# Patient Record
Sex: MALE | Race: OTHER | NOT HISPANIC OR LATINO | URBAN - METROPOLITAN AREA
[De-identification: names, ages, dates, MRNs, and addresses within clinical notes are randomized per-mention and may not be internally consistent; named-entity substitution may affect disease eponyms.]

---

## 2018-03-22 ENCOUNTER — INPATIENT (INPATIENT)
Facility: HOSPITAL | Age: 58
LOS: 2 days | Discharge: HOME | DRG: 247 | End: 2018-03-25
Attending: STUDENT IN AN ORGANIZED HEALTH CARE EDUCATION/TRAINING PROGRAM | Admitting: INTERNAL MEDICINE
Payer: MEDICAID

## 2018-03-22 VITALS
SYSTOLIC BLOOD PRESSURE: 116 MMHG | TEMPERATURE: 98 F | RESPIRATION RATE: 20 BRPM | DIASTOLIC BLOOD PRESSURE: 76 MMHG | OXYGEN SATURATION: 96 % | HEART RATE: 86 BPM

## 2018-03-22 DIAGNOSIS — I21.3 ST ELEVATION (STEMI) MYOCARDIAL INFARCTION OF UNSPECIFIED SITE: ICD-10-CM

## 2018-03-22 LAB
ANION GAP SERPL CALC-SCNC: 13 MMOL/L — SIGNIFICANT CHANGE UP (ref 5–17)
BUN SERPL-MCNC: 14 MG/DL — SIGNIFICANT CHANGE UP (ref 7–23)
CALCIUM SERPL-MCNC: 8.9 MG/DL — SIGNIFICANT CHANGE UP (ref 8.4–10.5)
CHLORIDE SERPL-SCNC: 102 MMOL/L — SIGNIFICANT CHANGE UP (ref 96–108)
CK MB BLD-MCNC: 10.1 % — HIGH (ref 0–3.5)
CK MB BLD-MCNC: 10.8 % — HIGH (ref 0–3.5)
CK MB CFR SERPL CALC: 507.2 NG/ML — HIGH (ref 0–6.7)
CK MB CFR SERPL CALC: 573.5 NG/ML — HIGH (ref 0–6.7)
CK SERPL-CCNC: 5028 U/L — HIGH (ref 30–200)
CK SERPL-CCNC: 5301 U/L — HIGH (ref 30–200)
CO2 SERPL-SCNC: 23 MMOL/L — SIGNIFICANT CHANGE UP (ref 22–31)
CREAT SERPL-MCNC: 0.81 MG/DL — SIGNIFICANT CHANGE UP (ref 0.5–1.3)
GLUCOSE SERPL-MCNC: 105 MG/DL — HIGH (ref 70–99)
HCT VFR BLD CALC: 42.6 % — SIGNIFICANT CHANGE UP (ref 39–50)
HGB BLD-MCNC: 14.8 G/DL — SIGNIFICANT CHANGE UP (ref 13–17)
MAGNESIUM SERPL-MCNC: 2 MG/DL — SIGNIFICANT CHANGE UP (ref 1.6–2.6)
MCHC RBC-ENTMCNC: 29.5 PG — SIGNIFICANT CHANGE UP (ref 27–34)
MCHC RBC-ENTMCNC: 34.7 GM/DL — SIGNIFICANT CHANGE UP (ref 32–36)
MCV RBC AUTO: 85 FL — SIGNIFICANT CHANGE UP (ref 80–100)
NT-PROBNP SERPL-SCNC: 129 PG/ML — SIGNIFICANT CHANGE UP (ref 0–300)
PHOSPHATE SERPL-MCNC: 3 MG/DL — SIGNIFICANT CHANGE UP (ref 2.5–4.5)
PLATELET # BLD AUTO: 206 K/UL — SIGNIFICANT CHANGE UP (ref 150–400)
POTASSIUM SERPL-MCNC: 4.2 MMOL/L — SIGNIFICANT CHANGE UP (ref 3.5–5.3)
POTASSIUM SERPL-SCNC: 4.2 MMOL/L — SIGNIFICANT CHANGE UP (ref 3.5–5.3)
RBC # BLD: 5 M/UL — SIGNIFICANT CHANGE UP (ref 4.2–5.8)
RBC # FLD: 11.9 % — SIGNIFICANT CHANGE UP (ref 10.3–14.5)
SODIUM SERPL-SCNC: 138 MMOL/L — SIGNIFICANT CHANGE UP (ref 135–145)
TROPONIN T SERPL-MCNC: 19.2 NG/ML — HIGH (ref 0–0.06)
TROPONIN T SERPL-MCNC: 7.98 NG/ML — HIGH (ref 0–0.06)
WBC # BLD: 6.9 K/UL — SIGNIFICANT CHANGE UP (ref 3.8–10.5)
WBC # FLD AUTO: 6.9 K/UL — SIGNIFICANT CHANGE UP (ref 3.8–10.5)

## 2018-03-22 PROCEDURE — 99223 1ST HOSP IP/OBS HIGH 75: CPT | Mod: GC

## 2018-03-22 PROCEDURE — 93458 L HRT ARTERY/VENTRICLE ANGIO: CPT | Mod: 26,59

## 2018-03-22 PROCEDURE — 92941 PRQ TRLML REVSC TOT OCCL AMI: CPT | Mod: RC

## 2018-03-22 PROCEDURE — 92921: CPT | Mod: RC

## 2018-03-22 PROCEDURE — 93306 TTE W/DOPPLER COMPLETE: CPT | Mod: 26

## 2018-03-22 PROCEDURE — 93010 ELECTROCARDIOGRAM REPORT: CPT | Mod: 59

## 2018-03-22 RX ORDER — INFLUENZA VIRUS VACCINE 15; 15; 15; 15 UG/.5ML; UG/.5ML; UG/.5ML; UG/.5ML
0.5 SUSPENSION INTRAMUSCULAR ONCE
Qty: 0 | Refills: 0 | Status: DISCONTINUED | OUTPATIENT
Start: 2018-03-22 | End: 2018-03-25

## 2018-03-22 RX ORDER — ATORVASTATIN CALCIUM 80 MG/1
80 TABLET, FILM COATED ORAL AT BEDTIME
Qty: 0 | Refills: 0 | Status: DISCONTINUED | OUTPATIENT
Start: 2018-03-22 | End: 2018-03-25

## 2018-03-22 RX ORDER — ASPIRIN/CALCIUM CARB/MAGNESIUM 324 MG
81 TABLET ORAL DAILY
Qty: 0 | Refills: 0 | Status: DISCONTINUED | OUTPATIENT
Start: 2018-03-23 | End: 2018-03-24

## 2018-03-22 RX ORDER — CLOPIDOGREL BISULFATE 75 MG/1
75 TABLET, FILM COATED ORAL DAILY
Qty: 0 | Refills: 0 | Status: DISCONTINUED | OUTPATIENT
Start: 2018-03-23 | End: 2018-03-25

## 2018-03-22 RX ORDER — HEPARIN SODIUM 5000 [USP'U]/ML
5000 INJECTION INTRAVENOUS; SUBCUTANEOUS EVERY 8 HOURS
Qty: 0 | Refills: 0 | Status: DISCONTINUED | OUTPATIENT
Start: 2018-03-22 | End: 2018-03-25

## 2018-03-22 RX ADMIN — ATORVASTATIN CALCIUM 80 MILLIGRAM(S): 80 TABLET, FILM COATED ORAL at 22:46

## 2018-03-22 RX ADMIN — HEPARIN SODIUM 5000 UNIT(S): 5000 INJECTION INTRAVENOUS; SUBCUTANEOUS at 22:46

## 2018-03-22 NOTE — CHART NOTE - NSCHARTNOTEFT_GEN_A_CORE
====================  CCU MIDNIGHT ROUNDS  ====================    [facility] SK[facility]  [id number]  Patient is a 58y old  Male who presents with a chief complaint of Chest pain (22 Mar 2018 14:25)      ====================  SUMMARY:  ====================  59 y/o M current smoker w/ PMHx of HLD p/w inferior wall STEMI s/p PCI w/ 3x AUDREY to RCA.       ====================  NEW EVENTS:  ====================    Received AUDREY x 3 to RCA for IWSTEMI.     ====================  VITALS (Last 12 hrs):  ====================    T(C): 36.7 (03-22-18 @ 17:00), Max: 36.7 (03-22-18 @ 13:25)  T(F): 98.1 (03-22-18 @ 17:00), Max: 98.1 (03-22-18 @ 13:25)  HR: 74 (03-22-18 @ 21:45) (68 - 96)  BP: 118/74 (03-22-18 @ 21:45) (97/66 - 129/65)  BP(mean): 85 (03-22-18 @ 21:45) (77 - 98)  ABP: --  ABP(mean): --  RR: 19 (03-22-18 @ 21:45) (13 - 40)  SpO2: 98% (03-22-18 @ 21:45) (96% - 100%)  Wt(kg): --  CVP(mm Hg): --  CVP(cm H2O): --  CO: --  CI: --  PA: --  PA(mean): --  PCWP: --  SVR: --  PVR: --    I&O's Summary    22 Mar 2018 07:01  -  22 Mar 2018 22:45  --------------------------------------------------------  IN: 300 mL / OUT: 300 mL / NET: 0 mL            ====================  NEW LABS:  ====================                          14.8   6.9   )-----------( 206      ( 22 Mar 2018 14:20 )             42.6     03-22    138  |  102  |  14  ----------------------------<  105<H>  4.2   |  23  |  0.81    Ca    8.9      22 Mar 2018 14:20  Phos  3.0     03-22  Mg     2.0     03-22        Troponin T, Serum: 7.98 ng/mL <H> (03-22-18 @ 22:05)  Creatine Kinase, Serum: 5301 U/L <H> (03-22-18 @ 14:20)  Troponin T, Serum: 19.20 ng/mL <H> (03-22-18 @ 14:20)    CKMB Units: 573.5 ng/mL (03-22 @ 14:20)          ====================  PLAN:  ====================  #CV: CHRISSY s/p AUDREY x3  -continue aspirin, plavix, atorvastatin   -start lopressor when blood pressure can tolerate  -trend cardiac enzymes  -Possible staged PCI tomorrow. Will follow up with interventional cards for LAD, possibly 70%  -replete K and Mg as necessary

## 2018-03-22 NOTE — H&P ADULT - NSHPSOCIALHISTORY_GEN_ALL_CORE
Social History:    Marital Status:  ( X  )    (   ) Single    (   )    (  )   Occupation: [employer]    Lives with: (  ) alone  ( X ) children   (  X) spouse   (  ) parents  (  ) other    Substance Use (street drugs): (X  ) never used  (  ) other:  Tobacco Usage:  (   ) never smoked   (   ) former smoker   ( X  ) current smoker- started 20-25 years ago, 10 cigarettes a day   Alcohol Usage: Denies

## 2018-03-22 NOTE — H&P ADULT - NSHPLABSRESULTS_GEN_ALL_CORE
< from: Cardiac Cath Lab - Adult (03.22.18 @ 11:30) >    VENTRICLES: EF estimated was 40 %.  CORONARY VESSELS: The coronary circulation is right dominant.  LM:   --  LM: Angiography showed minor luminal irregularities with no flow  limiting lesions.  LAD:   --  Mid LAD: There was a 25 % stenosis.  --  D1: There was a 50 % stenosis.  CX:   --  OM1: There was a 100 % stenosis.  RCA:   --  Proximal RCA: There was a 80 % stenosis.  --  Mid RCA: There was a 100 % stenosis.  --  RPDA: There was a 80 % stenosis.  COMPLICATIONS: There were no complications.  DIAGNOSTIC RECOMMENDATIONS: ASA and Plavix for 1 year.  INTERVENTIONAL RECOMMENDATIONS: ASA and Plavix for 1 year.    < end of copied text > 14.8   6.9   )-----------( 206      ( 22 Mar 2018 14:20 )             42.6       03-22    138  |  102  |  14  ----------------------------<  105<H>  4.2   |  23  |  0.81    Ca    8.9      22 Mar 2018 14:20  Phos  3.0     03-22  Mg     2.0     03-22            CARDIAC MARKERS ( 22 Mar 2018 14:20 )  x     / 19.20 ng/mL / 5301 U/L / x     / 573.5 ng/mL        < from: Cardiac Cath Lab - Adult (03.22.18 @ 11:30) >    VENTRICLES: EF estimated was 40 %.  CORONARY VESSELS: The coronary circulation is right dominant.  LM:   --  LM: Angiography showed minor luminal irregularities with no flow  limiting lesions.  LAD:   --  Mid LAD: There was a 25 % stenosis.  --  D1: There was a 50 % stenosis.  CX:   --  OM1: There was a 100 % stenosis.  RCA:   --  Proximal RCA: There was a 80 % stenosis.  --  Mid RCA: There was a 100 % stenosis.  --  RPDA: There was a 80 % stenosis.  COMPLICATIONS: There were no complications.  DIAGNOSTIC RECOMMENDATIONS: ASA and Plavix for 1 year.  INTERVENTIONAL RECOMMENDATIONS: ASA and Plavix for 1 year.    < end of copied text >    Initial EKG showed NSR, HR 72, nl axis, QTc 392, ST seg elevations in II, III, aVF, ST depressions in aVL, V1-V3

## 2018-03-22 NOTE — H&P ADULT - ASSESSMENT
59 y/o M current smoker w/ PMHx of HLD p/w 57 y/o M current smoker w/ PMHx of HLD p/w inferior wall STEMI s/p PCI w/ 3x AUDREY to RCA.     #Neuro- Stable, no deficits     #CV- s/p PCI w/ 3 AUDREY to RCA  - started on ASA, Plavix, Atorvastatin  - BP soft will delay starting BB and ACEi  - Trend Mao, monitor EKGs     #Pulm- stable, sating well on RA    #GI- tolerating PO, started DASH/TLC     #Renal- Cr stable, urinating w/o difficulty     #Electrolytes- stable, replete Mag<2 or K<4    #Heme- CBC stable, HSQ for DVT ppx    #Endocrine- stable, checking A1c and TSH 59 y/o M current smoker w/ PMHx of HLD p/w inferior wall STEMI s/p PCI w/ 3x AUDREY to RCA.     #Neuro- Stable, no deficits     #CV- s/p PCI w/ 3 AUDREY to RCA  - started on ASA, Plavix, Atorvastatin  - BP soft will delay starting BB and ACEi  - Trend Mao, monitor EKGs   - Check TTE    #Pulm- stable, sating well on RA    #GI- tolerating PO, started DASH/TLC     #Renal- Cr stable, urinating w/o difficulty     #Electrolytes- stable, replete Mag<2 or K<4    #Heme- CBC stable, HSQ for DVT ppx    #Endocrine- stable, checking A1c and TSH

## 2018-03-22 NOTE — H&P ADULT - HISTORY OF PRESENT ILLNESS
59 y/o M current smoker w/ PMHx of HLD p/w chest pain.   Patient reports waking up this morning feeling well. He then reports shoveling snow without any difficulties and then getting in his car. While he was driving his car, he reports feeling substernal chest pain at around 8am. He reports the pain as needles in his sub sternum that was 4 or 5/10 in intensity, didn't move around or radiate anywhere. At this time he reports just not feeling well, but couldn't specify anything beyond that.   Denies any SOB, palpitations, diaphoresis, F/C, headaches, N/V. No recent travel or sick contacts.   He reports initially driving to his PCP in [place] while having the CP, but his office wasn't open, so he then drove to his brothers house. He brother then drove him to [facility]. There they diagnosed  him with a STEMI and transferred him to [facility] for an emergent cath. 57 y/o M current smoker w/ PMHx of HLD p/w chest pain.   Patient reports waking up this morning feeling well. He then reports shoveling snow without any difficulties and then getting in his car. While he was driving his car, he reports feeling substernal chest pain at around 8am. He reports the pain as needles in his sub sternum that was 4 or 5/10 in intensity, didn't move around or radiate anywhere. At this time he reports just not feeling well, but couldn't specify anything beyond that.   Denies any SOB, palpitations, diaphoresis, F/C, headaches, N/V. No recent travel or sick contacts.   He reports initially driving to his PCP in [place] while having the CP, but his office wasn't open, so he then drove to his brothers house. He brother then drove him to [facility]. There they diagnosed  him with a STEMI and transferred him to [facility] for an emergent cath.   Initial labs at OSH showed Trop 0.147, . Loaded with ASA 325mg, Plavix 600mg, Heparin bolus

## 2018-03-22 NOTE — H&P ADULT - NSHPPHYSICALEXAM_GEN_ALL_CORE
Vital Signs Last 24 Hrs  T(C): 36.7 (22 Mar 2018 13:25), Max: 36.7 (22 Mar 2018 13:25)  T(F): 98.1 (22 Mar 2018 13:25), Max: 98.1 (22 Mar 2018 13:25)  HR: 80 (22 Mar 2018 14:15) (76 - 86)  BP: 114/86 (22 Mar 2018 14:15) (105/81 - 117/81)  BP(mean): 98 (22 Mar 2018 14:15) (89 - 98)  RR: 24 (22 Mar 2018 14:15) (16 - 24)  SpO2: 100% (22 Mar 2018 14:15) (96% - 100%)    PHYSICAL EXAM:    GENERAL: Comfortable, no acute distress   HEAD:  Normocephalic, atraumatic  EYES: EOMI, PERRLA  HEENT: Moist mucous membranes  NECK: Supple, No JVD  NERVOUS SYSTEM:  Alert & Oriented X3, Motor Strength 5/5 B/L upper and lower extremities  CHEST/LUNG: Clear to auscultation bilaterally  HEART: Regular rate and rhythm, no murmur   ABDOMEN: Soft, Nontender, Nondistended, Bowel sounds present  EXTREMITIES:   No clubbing, cyanosis, or edema  MUSCULOSKELETAL: No muscle tenderness, no joint tenderness  SKIN:  warm and dry, no rash, Band on R radial artery, no bleeding or hematoma seen

## 2018-03-22 NOTE — H&P ADULT - NEGATIVE NEUROLOGICAL SYMPTOMS
no generalized seizures/no weakness/no transient paralysis/no focal seizures/no syncope/no paresthesias

## 2018-03-23 LAB
ALBUMIN SERPL ELPH-MCNC: 3.7 G/DL — SIGNIFICANT CHANGE UP (ref 3.3–5)
ALP SERPL-CCNC: 45 U/L — SIGNIFICANT CHANGE UP (ref 40–120)
ALT FLD-CCNC: 111 U/L RC — HIGH (ref 10–45)
ANION GAP SERPL CALC-SCNC: 15 MMOL/L — SIGNIFICANT CHANGE UP (ref 5–17)
APTT BLD: 26.6 SEC — LOW (ref 27.5–37.4)
AST SERPL-CCNC: 392 U/L — HIGH (ref 10–40)
BILIRUB SERPL-MCNC: 0.5 MG/DL — SIGNIFICANT CHANGE UP (ref 0.2–1.2)
BUN SERPL-MCNC: 14 MG/DL — SIGNIFICANT CHANGE UP (ref 7–23)
CALCIUM SERPL-MCNC: 9 MG/DL — SIGNIFICANT CHANGE UP (ref 8.4–10.5)
CHLORIDE SERPL-SCNC: 103 MMOL/L — SIGNIFICANT CHANGE UP (ref 96–108)
CHOLEST SERPL-MCNC: 167 MG/DL — SIGNIFICANT CHANGE UP (ref 10–199)
CK MB BLD-MCNC: 8.2 % — HIGH (ref 0–3.5)
CK MB CFR SERPL CALC: 295.9 NG/ML — HIGH (ref 0–6.7)
CK SERPL-CCNC: 3594 U/L — HIGH (ref 30–200)
CO2 SERPL-SCNC: 22 MMOL/L — SIGNIFICANT CHANGE UP (ref 22–31)
CREAT SERPL-MCNC: 0.83 MG/DL — SIGNIFICANT CHANGE UP (ref 0.5–1.3)
GLUCOSE SERPL-MCNC: 112 MG/DL — HIGH (ref 70–99)
HBA1C BLD-MCNC: 5.6 % — SIGNIFICANT CHANGE UP (ref 4–5.6)
HCT VFR BLD CALC: 39.7 % — SIGNIFICANT CHANGE UP (ref 39–50)
HDLC SERPL-MCNC: 25 MG/DL — LOW (ref 40–125)
HGB BLD-MCNC: 13.4 G/DL — SIGNIFICANT CHANGE UP (ref 13–17)
INR BLD: 1.17 RATIO — HIGH (ref 0.88–1.16)
LIPID PNL WITH DIRECT LDL SERPL: 92 MG/DL — SIGNIFICANT CHANGE UP
MAGNESIUM SERPL-MCNC: 2.2 MG/DL — SIGNIFICANT CHANGE UP (ref 1.6–2.6)
MCHC RBC-ENTMCNC: 29 PG — SIGNIFICANT CHANGE UP (ref 27–34)
MCHC RBC-ENTMCNC: 33.7 GM/DL — SIGNIFICANT CHANGE UP (ref 32–36)
MCV RBC AUTO: 86 FL — SIGNIFICANT CHANGE UP (ref 80–100)
PHOSPHATE SERPL-MCNC: 3.8 MG/DL — SIGNIFICANT CHANGE UP (ref 2.5–4.5)
PLATELET # BLD AUTO: 207 K/UL — SIGNIFICANT CHANGE UP (ref 150–400)
POTASSIUM SERPL-MCNC: 4.5 MMOL/L — SIGNIFICANT CHANGE UP (ref 3.5–5.3)
POTASSIUM SERPL-SCNC: 4.5 MMOL/L — SIGNIFICANT CHANGE UP (ref 3.5–5.3)
PROT SERPL-MCNC: 6.6 G/DL — SIGNIFICANT CHANGE UP (ref 6–8.3)
PROTHROM AB SERPL-ACNC: 12.8 SEC — HIGH (ref 9.8–12.7)
RBC # BLD: 4.62 M/UL — SIGNIFICANT CHANGE UP (ref 4.2–5.8)
RBC # FLD: 12.1 % — SIGNIFICANT CHANGE UP (ref 10.3–14.5)
SODIUM SERPL-SCNC: 140 MMOL/L — SIGNIFICANT CHANGE UP (ref 135–145)
TOTAL CHOLESTEROL/HDL RATIO MEASUREMENT: 6.7 RATIO — SIGNIFICANT CHANGE UP (ref 3.4–9.6)
TRIGL SERPL-MCNC: 252 MG/DL — HIGH (ref 10–149)
TROPONIN T SERPL-MCNC: 5.7 NG/ML — HIGH (ref 0–0.06)
TSH SERPL-MCNC: 2.34 UIU/ML — SIGNIFICANT CHANGE UP (ref 0.27–4.2)
WBC # BLD: 6.2 K/UL — SIGNIFICANT CHANGE UP (ref 3.8–10.5)
WBC # FLD AUTO: 6.2 K/UL — SIGNIFICANT CHANGE UP (ref 3.8–10.5)

## 2018-03-23 PROCEDURE — 99233 SBSQ HOSP IP/OBS HIGH 50: CPT | Mod: 25,GC

## 2018-03-23 PROCEDURE — 93010 ELECTROCARDIOGRAM REPORT: CPT

## 2018-03-23 RX ORDER — METOPROLOL TARTRATE 50 MG
25 TABLET ORAL DAILY
Qty: 0 | Refills: 0 | Status: DISCONTINUED | OUTPATIENT
Start: 2018-03-23 | End: 2018-03-25

## 2018-03-23 RX ORDER — METOPROLOL TARTRATE 50 MG
12.5 TABLET ORAL
Qty: 0 | Refills: 0 | Status: DISCONTINUED | OUTPATIENT
Start: 2018-03-23 | End: 2018-03-23

## 2018-03-23 RX ADMIN — HEPARIN SODIUM 5000 UNIT(S): 5000 INJECTION INTRAVENOUS; SUBCUTANEOUS at 05:03

## 2018-03-23 RX ADMIN — Medication 25 MILLIGRAM(S): at 11:18

## 2018-03-23 RX ADMIN — HEPARIN SODIUM 5000 UNIT(S): 5000 INJECTION INTRAVENOUS; SUBCUTANEOUS at 13:44

## 2018-03-23 RX ADMIN — HEPARIN SODIUM 5000 UNIT(S): 5000 INJECTION INTRAVENOUS; SUBCUTANEOUS at 21:27

## 2018-03-23 RX ADMIN — Medication 81 MILLIGRAM(S): at 11:18

## 2018-03-23 RX ADMIN — ATORVASTATIN CALCIUM 80 MILLIGRAM(S): 80 TABLET, FILM COATED ORAL at 21:27

## 2018-03-23 RX ADMIN — CLOPIDOGREL BISULFATE 75 MILLIGRAM(S): 75 TABLET, FILM COATED ORAL at 11:19

## 2018-03-23 NOTE — CHART NOTE - NSCHARTNOTEFT_GEN_A_CORE
====================  CCU MIDNIGHT ROUNDS  ====================    [facility] SK[facility]  [id number]  Patient is a 58y old  Male who presents with a chief complaint of Chest pain (22 Mar 2018 14:25)      ====================  SUMMARY:  ====================  57 y/o M current smoker w/ PMHx of HLD p/w inferior wall STEMI s/p PCI w/ 3x AUDREY to RCA.     ====================  NEW EVENTS:  ====================    S/p AUDREY x 3 to RCA yesterday. Started on toprol 25mg QD.     ====================  VITALS (Last 12 hrs):  ====================    T(C): 36.7 (03-23-18 @ 16:00), Max: 36.8 (03-23-18 @ 12:00)  T(F): 98 (03-23-18 @ 16:00), Max: 98.2 (03-23-18 @ 12:00)  HR: 72 (03-23-18 @ 21:30) (70 - 88)  BP: 108/56 (03-23-18 @ 21:30) (105/61 - 115/59)  BP(mean): 73 (03-23-18 @ 21:30) (70 - 89)  ABP: --  ABP(mean): --  RR: 18 (03-23-18 @ 21:30) (16 - 20)  SpO2: 100% (03-23-18 @ 21:30) (92% - 100%)  Wt(kg): --  CVP(mm Hg): --  CVP(cm H2O): --  CO: --  CI: --  PA: --  PA(mean): --  PCWP: --  SVR: --  PVR: --    I&O's Summary    22 Mar 2018 07:01  -  23 Mar 2018 07:00  --------------------------------------------------------  IN: 420 mL / OUT: 300 mL / NET: 120 mL    23 Mar 2018 07:01  -  23 Mar 2018 21:51  --------------------------------------------------------  IN: 570 mL / OUT: 0 mL / NET: 570 mL            ====================  NEW LABS:  ====================                          13.4   6.2   )-----------( 207      ( 23 Mar 2018 05:21 )             39.7     03-23    140  |  103  |  14  ----------------------------<  112<H>  4.5   |  22  |  0.83    Ca    9.0      23 Mar 2018 05:21  Phos  3.8     03-23  Mg     2.2     03-23    TPro  6.6  /  Alb  3.7  /  TBili  0.5  /  DBili  x   /  AST  392<H>  /  ALT  111<H>  /  AlkPhos  45  03-23    PT/INR - ( 23 Mar 2018 05:21 )   PT: 12.8 sec;   INR: 1.17 ratio         PTT - ( 23 Mar 2018 05:21 )  PTT:26.6 sec  Creatine Kinase, Serum: 3594 U/L <H> (03-23-18 @ 05:21)  Troponin T, Serum: 5.70 ng/mL <H> (03-23-18 @ 05:21)  Creatine Kinase, Serum: 5028 U/L <H> (03-22-18 @ 22:05)  Troponin T, Serum: 7.98 ng/mL <H> (03-22-18 @ 22:05)    CKMB Units: 295.9 ng/mL (03-23 @ 05:21)  CKMB Units: 507.2 ng/mL (03-22 @ 22:05)          ====================  PLAN:  ====================  #CV: IWSTEMI s/p AUDREY x3  -continue aspirin, plavix, atorvastatin   -started toprol 25mg QD today. Hemodynamically stable  -cardiac enzymes trending down  -No plan for staged PCI  -replete K and Mg as necessary

## 2018-03-23 NOTE — PROGRESS NOTE ADULT - SUBJECTIVE AND OBJECTIVE BOX
Patient is a 58y old  Male who presents with a chief complaint of Chest pain (22 Mar 2018 14:25)    Event	  No acute events overnight.   Ambulating this morning without problems.   Denies any CP, SOB, F/C, N/V, headache, dizziness, abd pain, or dysuria     MEDICATIONS  (STANDING):  aspirin  chewable 81 milliGRAM(s) Oral daily  atorvastatin 80 milliGRAM(s) Oral at bedtime  clopidogrel Tablet 75 milliGRAM(s) Oral daily  heparin  Injectable 5000 Unit(s) SubCutaneous every 8 hours  influenza   Vaccine 0.5 milliLiter(s) IntraMuscular once    MEDICATIONS  (PRN):      REVIEW OF SYSTEMS:  Otherwise, 10 point ROS done and otherwise negative.    PHYSICAL EXAM:  Vital Signs Last 24 Hrs  T(C): 36.7 (22 Mar 2018 22:50), Max: 36.7 (22 Mar 2018 13:25)  T(F): 98.1 (22 Mar 2018 22:50), Max: 98.1 (22 Mar 2018 13:25)  HR: 74 (23 Mar 2018 08:00) (64 - 96)  BP: 103/61 (23 Mar 2018 07:00) (94/58 - 129/65)  BP(mean): 83 (23 Mar 2018 07:00) (72 - 101)  RR: 20 (23 Mar 2018 07:00) (11 - 40)  SpO2: 95% (23 Mar 2018 07:00) (95% - 100%)  I&O's Summary    22 Mar 2018 07:01  -  23 Mar 2018 07:00  --------------------------------------------------------  IN: 420 mL / OUT: 300 mL / NET: 120 mL        Appearance: Normal	  HEENT:   Normal oral mucosa, PERRL, EOMI	  Lymphatic: No lymphadenopathy  Cardiovascular: Normal S1 S2, No JVD, No murmurs, No edema  Respiratory: Lungs clear to auscultation	  Psychiatry: A & O x 3, Mood & affect appropriate  Gastrointestinal:  Soft, Non-tender, + BS	  Skin: No rashes, No ecchymoses, No cyanosis	  Neurologic: Non-focal  Extremities: Normal range of motion, No clubbing, cyanosis or edema, bandage over R wrist, no bleeding or hematoma seen   Vascular: Peripheral pulses palpable 2+ bilaterally    LABS:	 	                        13.4   6.2   )-----------( 207      ( 23 Mar 2018 05:21 )             39.7     Auto Eosinophil # x     / Auto Eosinophil % x     / Auto Neutrophil # x     / Auto Neutrophil % x     / BANDS % x                            14.8   6.9   )-----------( 206      ( 22 Mar 2018 14:20 )             42.6     Auto Eosinophil # x     / Auto Eosinophil % x     / Auto Neutrophil # x     / Auto Neutrophil % x     / BANDS % x        INR: 1.17 ratio (03-23 @ 05:21)    03-23    140  |  103  |  14  ----------------------------<  112<H>  4.5   |  22  |  0.83  03-22    138  |  102  |  14  ----------------------------<  105<H>  4.2   |  23  |  0.81    Ca    9.0      23 Mar 2018 05:21  Mg     2.2     03-23  Phos  3.8     03-23  TPro  6.6  /  Alb  3.7  /  TBili  0.5  /  DBili  x   /  AST  392<H>  /  ALT  111<H>  /  AlkPhos  45  03-23        proBNP: Serum Pro-Brain Natriuretic Peptide: 129 pg/mL (03-22 @ 14:20)    Lipid Profile: 03-23 Chol 167 LDL 92 HDL 25<L> Trig 252<H>  HgA1c: 5.6 % (03-23 @ 04:52)    TSH: Thyroid Stimulating Hormone, Serum: 2.34 uIU/mL (03-23 @ 04:52)      CARDIAC MARKERS:   23 Mar 2018 05:21 Troponin 5.70 ng/mL / Creatine Kinase 3594 U/L /  CKMB 295.9 ng/mL / CPK Mass Assay % 8.2 %   22 Mar 2018 22:05 Troponin 7.98 ng/mL / Creatine Kinase 5028 U/L /  CKMB 507.2 ng/mL / CPK Mass Assay % 10.1 %   22 Mar 2018 14:20 Troponin 19.20 ng/mL / Creatine Kinase 5301 U/L /  CKMB 573.5 ng/mL / CPK Mass Assay % 10.8 %        TELEMETRY: 	    ECG:  	  RADIOLOGY:  OTHER: 	    PREVIOUS DIAGNOSTIC TESTING:    [X ] Echocardiogram:  < from: Transthoracic Echocardiogram (03.22.18 @ 15:12) >  EF (Visual Estimate): 40-45 %  Conclusions:  1. Mild-moderate segmental left ventricular systolic  dysfunction. The inferolateral wall, and the inferior wall  are hypokinetic.  2. Low Normal right ventricular size and function. TAPSE  1.6cm  3. Estimated pulmonary artery systolic pressure equals 27  mm Hg, assuming right atrial pressure equals 8  mm Hg,  consistent with normal pulmonary pressures.    < end of copied text >  [ ]  Catheterization:  [ ] Stress Test:

## 2018-03-24 DIAGNOSIS — E78.5 HYPERLIPIDEMIA, UNSPECIFIED: ICD-10-CM

## 2018-03-24 DIAGNOSIS — I21.11 ST ELEVATION (STEMI) MYOCARDIAL INFARCTION INVOLVING RIGHT CORONARY ARTERY: ICD-10-CM

## 2018-03-24 LAB
ALBUMIN SERPL ELPH-MCNC: 3.7 G/DL — SIGNIFICANT CHANGE UP (ref 3.3–5)
ALP SERPL-CCNC: 49 U/L — SIGNIFICANT CHANGE UP (ref 40–120)
ALT FLD-CCNC: 86 U/L RC — HIGH (ref 10–45)
ANION GAP SERPL CALC-SCNC: 12 MMOL/L — SIGNIFICANT CHANGE UP (ref 5–17)
APTT BLD: 29.6 SEC — SIGNIFICANT CHANGE UP (ref 27.5–37.4)
AST SERPL-CCNC: 180 U/L — HIGH (ref 10–40)
BILIRUB SERPL-MCNC: 0.4 MG/DL — SIGNIFICANT CHANGE UP (ref 0.2–1.2)
BUN SERPL-MCNC: 13 MG/DL — SIGNIFICANT CHANGE UP (ref 7–23)
CALCIUM SERPL-MCNC: 8.9 MG/DL — SIGNIFICANT CHANGE UP (ref 8.4–10.5)
CHLORIDE SERPL-SCNC: 102 MMOL/L — SIGNIFICANT CHANGE UP (ref 96–108)
CO2 SERPL-SCNC: 25 MMOL/L — SIGNIFICANT CHANGE UP (ref 22–31)
CREAT SERPL-MCNC: 0.84 MG/DL — SIGNIFICANT CHANGE UP (ref 0.5–1.3)
GLUCOSE SERPL-MCNC: 106 MG/DL — HIGH (ref 70–99)
HCT VFR BLD CALC: 38.7 % — LOW (ref 39–50)
HGB BLD-MCNC: 13.2 G/DL — SIGNIFICANT CHANGE UP (ref 13–17)
INR BLD: 1.15 RATIO — SIGNIFICANT CHANGE UP (ref 0.88–1.16)
MAGNESIUM SERPL-MCNC: 2.2 MG/DL — SIGNIFICANT CHANGE UP (ref 1.6–2.6)
MCHC RBC-ENTMCNC: 29.2 PG — SIGNIFICANT CHANGE UP (ref 27–34)
MCHC RBC-ENTMCNC: 34.2 GM/DL — SIGNIFICANT CHANGE UP (ref 32–36)
MCV RBC AUTO: 85.5 FL — SIGNIFICANT CHANGE UP (ref 80–100)
PHOSPHATE SERPL-MCNC: 3.4 MG/DL — SIGNIFICANT CHANGE UP (ref 2.5–4.5)
PLATELET # BLD AUTO: 176 K/UL — SIGNIFICANT CHANGE UP (ref 150–400)
POTASSIUM SERPL-MCNC: 4.3 MMOL/L — SIGNIFICANT CHANGE UP (ref 3.5–5.3)
POTASSIUM SERPL-SCNC: 4.3 MMOL/L — SIGNIFICANT CHANGE UP (ref 3.5–5.3)
PROT SERPL-MCNC: 6.5 G/DL — SIGNIFICANT CHANGE UP (ref 6–8.3)
PROTHROM AB SERPL-ACNC: 12.5 SEC — SIGNIFICANT CHANGE UP (ref 9.8–12.7)
RBC # BLD: 4.52 M/UL — SIGNIFICANT CHANGE UP (ref 4.2–5.8)
RBC # FLD: 12.1 % — SIGNIFICANT CHANGE UP (ref 10.3–14.5)
SODIUM SERPL-SCNC: 139 MMOL/L — SIGNIFICANT CHANGE UP (ref 135–145)
WBC # BLD: 5.8 K/UL — SIGNIFICANT CHANGE UP (ref 3.8–10.5)
WBC # FLD AUTO: 5.8 K/UL — SIGNIFICANT CHANGE UP (ref 3.8–10.5)

## 2018-03-24 PROCEDURE — 93010 ELECTROCARDIOGRAM REPORT: CPT

## 2018-03-24 PROCEDURE — 99233 SBSQ HOSP IP/OBS HIGH 50: CPT | Mod: GC

## 2018-03-24 RX ORDER — CAPTOPRIL 12.5 MG/1
6.25 TABLET ORAL THREE TIMES A DAY
Qty: 0 | Refills: 0 | Status: DISCONTINUED | OUTPATIENT
Start: 2018-03-24 | End: 2018-03-25

## 2018-03-24 RX ORDER — ASPIRIN/CALCIUM CARB/MAGNESIUM 324 MG
81 TABLET ORAL DAILY
Qty: 0 | Refills: 0 | Status: DISCONTINUED | OUTPATIENT
Start: 2018-03-25 | End: 2018-03-25

## 2018-03-24 RX ORDER — CAPTOPRIL 12.5 MG/1
6.25 TABLET ORAL ONCE
Qty: 0 | Refills: 0 | Status: COMPLETED | OUTPATIENT
Start: 2018-03-24 | End: 2018-03-24

## 2018-03-24 RX ADMIN — Medication 81 MILLIGRAM(S): at 10:38

## 2018-03-24 RX ADMIN — CAPTOPRIL 6.25 MILLIGRAM(S): 12.5 TABLET ORAL at 21:12

## 2018-03-24 RX ADMIN — HEPARIN SODIUM 5000 UNIT(S): 5000 INJECTION INTRAVENOUS; SUBCUTANEOUS at 14:19

## 2018-03-24 RX ADMIN — CAPTOPRIL 6.25 MILLIGRAM(S): 12.5 TABLET ORAL at 10:38

## 2018-03-24 RX ADMIN — Medication 25 MILLIGRAM(S): at 05:00

## 2018-03-24 RX ADMIN — HEPARIN SODIUM 5000 UNIT(S): 5000 INJECTION INTRAVENOUS; SUBCUTANEOUS at 21:12

## 2018-03-24 RX ADMIN — ATORVASTATIN CALCIUM 80 MILLIGRAM(S): 80 TABLET, FILM COATED ORAL at 21:12

## 2018-03-24 RX ADMIN — CLOPIDOGREL BISULFATE 75 MILLIGRAM(S): 75 TABLET, FILM COATED ORAL at 10:38

## 2018-03-24 RX ADMIN — HEPARIN SODIUM 5000 UNIT(S): 5000 INJECTION INTRAVENOUS; SUBCUTANEOUS at 05:01

## 2018-03-24 NOTE — PROGRESS NOTE ADULT - SUBJECTIVE AND OBJECTIVE BOX
Admission date: March 22  CHIEF COMPLAINT:   HPI: 59 y/o M current smoker w/ PMHx of HLD p/w chest pain.   Patient reports waking up this morning feeling well. He then reports shoveling snow without any difficulties and then getting in his car. While he was driving his car, he reports feeling substernal chest pain at around 8am. He reports the pain as needles in his sub sternum that was 4 or 5/10 in intensity, didn't move around or radiate anywhere. At this time he reports just not feeling well, but couldn't specify anything beyond that.   Denies any SOB, palpitations, diaphoresis, F/C, headaches, N/V. No recent travel or sick contacts.   He reports initially driving to his PCP in [place] while having the CP, but his office wasn't open, so he then drove to his brothers house. He brother then drove him to [facility]. There they diagnosed  him with a STEMI and transferred him to [facility] for an emergent cath.   Initial labs at OSH showed Trop 0.147, . Loaded with ASA 325mg, Plavix 600mg, Heparin bolus (22 Mar 2018 14:25)    INTERVAL HISTORY:    REVIEW OF SYSTEMS: Denies xxxxxx; all others negative    MEDICATIONS  (STANDING):  aspirin  chewable 81 milliGRAM(s) Oral daily  atorvastatin 80 milliGRAM(s) Oral at bedtime  clopidogrel Tablet 75 milliGRAM(s) Oral daily  heparin  Injectable 5000 Unit(s) SubCutaneous every 8 hours  influenza   Vaccine 0.5 milliLiter(s) IntraMuscular once  metoprolol succinate ER 25 milliGRAM(s) Oral daily    MEDICATIONS  (PRN):      Objective:  ICU Vital Signs Last 24 Hrs  T(C): 36.7 (24 Mar 2018 07:23), Max: 36.8 (23 Mar 2018 12:00)  T(F): 98 (24 Mar 2018 07:23), Max: 98.2 (23 Mar 2018 12:00)  HR: 68 (24 Mar 2018 07:23) (66 - 90)  BP: 106/63 (24 Mar 2018 07:23) (95/68 - 116/70)  BP(mean): 77 (24 Mar 2018 07:23) (70 - 89)  ABP: --  ABP(mean): --  RR: 16 (24 Mar 2018 07:23) (16 - 20)  SpO2: 98% (24 Mar 2018 07:23) (92% - 100%)      03-23 @ 07:01  -  03-24 @ 07:00  --------------------------------------------------------  IN: 630 mL / OUT: 0 mL / NET: 630 mL      Daily     Daily     PHYSICAL EXAM:  Constitutional:  HEENT:  Respiratory:  Cardiovascular:  Right radial access;  Gastrointestinal:  Genitourinary:  Extremities:  Vascular:  Neurological:  Skin:      TELEMETRY: sinus rhythm no events    EKG:     IMAGING:    Labs:                          13.2   5.8   )-----------( 176      ( 24 Mar 2018 05:22 )             38.7     03-24    139  |  102  |  13  ----------------------------<  106<H>  4.3   |  25  |  0.84    Ca    8.9      24 Mar 2018 05:22  Phos  3.4     03-24  Mg     2.2     03-24    TPro  6.5  /  Alb  3.7  /  TBili  0.4  /  DBili  x   /  AST  180<H>  /  ALT  86<H>  /  AlkPhos  49  03-24    LIVER FUNCTIONS - ( 24 Mar 2018 05:22 )  Alb: 3.7 g/dL / Pro: 6.5 g/dL / ALK PHOS: 49 U/L / ALT: 86 U/L RC / AST: 180 U/L / GGT: x           PT/INR - ( 24 Mar 2018 05:22 )   PT: 12.5 sec;   INR: 1.15 ratio         PTT - ( 24 Mar 2018 05:22 )  PTT:29.6 sec        HEALTH ISSUES - PROBLEM Dx: Admission date: March 22  CHIEF COMPLAINT: chest pain  HPI: 57 y/o M current smoker w/ PMHx of HLD p/w chest pain.   Patient reports waking up this morning feeling well. He then reports shoveling snow without any difficulties and then getting in his car. While he was driving his car, he reports feeling substernal chest pain at around 8am. He reports the pain as needles in his sub sternum that was 4 or 5/10 in intensity, didn't move around or radiate anywhere. At this time he reports just not feeling well, but couldn't specify anything beyond that.   Denies any SOB, palpitations, diaphoresis, F/C, headaches, N/V. No recent travel or sick contacts.   He reports initially driving to his PCP in [place] while having the CP, but his office wasn't open, so he then drove to his brothers house. He brother then drove him to [facility]. There they diagnosed  him with a STEMI and transferred him to [facility] for an emergent cath.   Initial labs at OSH showed Trop 0.147, . Loaded with ASA 325mg, Plavix 600mg, Heparin bolus (22 Mar 2018 14:25)    INTERVAL HISTORY: S/p PCI AUDREY x3 RCA; TTE EF 40-45%  OOB chair, ambulated in hallway without difficulty. No complaints offered    REVIEW OF SYSTEMS: Denies chest pain, SOB, palpitations, NV, dizziness; all others negative    MEDICATIONS  (STANDING):  aspirin  chewable 81 milliGRAM(s) Oral daily  atorvastatin 80 milliGRAM(s) Oral at bedtime  clopidogrel Tablet 75 milliGRAM(s) Oral daily  heparin  Injectable 5000 Unit(s) SubCutaneous every 8 hours  influenza   Vaccine 0.5 milliLiter(s) IntraMuscular once  metoprolol succinate ER 25 milliGRAM(s) Oral daily    MEDICATIONS  (PRN):      Objective:  ICU Vital Signs Last 24 Hrs  T(C): 36.7 (24 Mar 2018 07:23), Max: 36.8 (23 Mar 2018 12:00)  T(F): 98 (24 Mar 2018 07:23), Max: 98.2 (23 Mar 2018 12:00)  HR: 68 (24 Mar 2018 07:23) (66 - 90)  BP: 106/63 (24 Mar 2018 07:23) (95/68 - 116/70)  BP(mean): 77 (24 Mar 2018 07:23) (70 - 89)  ABP: --  ABP(mean): --  RR: 16 (24 Mar 2018 07:23) (16 - 20)  SpO2: 98% (24 Mar 2018 07:23) (92% - 100%)      03-23 @ 07:01  -  03-24 @ 07:00  --------------------------------------------------------  IN: 630 mL / OUT: 0 mL / NET: 630 mL      Daily     Daily     PHYSICAL EXAM:  Constitutional: NAD  HEENT: oral mucosa pink moist  Respiratory: Regular unlabored, CTA  Cardiovascular: RRR, S1 S2, no M/R/G, no edema  Right radial access: stable no hematoma/ecchymosis, + pulse, + cap refill  Gastrointestinal: soft ND/NT; + bowel sounds, tolerating diet  Genitourinary: voiding on own  Extremities: CHILDS equal strength  Vascular: warm peripherally  Neurological: A 7O x 3 mood & affect appropriate  Skin: warm dry intact, no rash/cyanosis      TELEMETRY: sinus rhythm no events    Labs:                        13.2   5.8   )-----------( 176      ( 24 Mar 2018 05:22 )             38.7     03-24    139  |  102  |  13  ----------------------------<  106<H>  4.3   |  25  |  0.84    Ca    8.9      24 Mar 2018 05:22  Phos  3.4     03-24  Mg     2.2     03-24    TPro  6.5  /  Alb  3.7  /  TBili  0.4  /  DBili  x   /  AST  180<H>  /  ALT  86<H>  /  AlkPhos  49  03-24    LIVER FUNCTIONS - ( 24 Mar 2018 05:22 )  Alb: 3.7 g/dL / Pro: 6.5 g/dL / ALK PHOS: 49 U/L / ALT: 86 U/L RC / AST: 180 U/L / GGT: x           PT/INR - ( 24 Mar 2018 05:22 )   PT: 12.5 sec;   INR: 1.15 ratio         PTT - ( 24 Mar 2018 05:22 )  PTT:29.6 sec        HEALTH ISSUES - PROBLEM Dx:

## 2018-03-24 NOTE — CHART NOTE - NSCHARTNOTEFT_GEN_A_CORE
====================  CCU MIDNIGHT ROUNDS  ====================    [facility] SKYE  [id number]  Patient is a 58y old  Male who presents with a chief complaint of Chest pain (22 Mar 2018 14:25)      ====================  SUMMARY:  ====================  57 y/o M current smoker w/ PMHx of HLD p/w inferior wall STEMI s/p PCI w/ 3x AUDREY to RCA.     ====================  NEW EVENTS:  ====================    Started captopril today     ====================  VITALS (Last 12 hrs):  ====================    T(C): 37.1 (03-24-18 @ 20:00), Max: 37.1 (03-24-18 @ 20:00)  T(F): 98.7 (03-24-18 @ 20:00), Max: 98.7 (03-24-18 @ 20:00)  HR: 70 (03-24-18 @ 22:00) (70 - 82)  BP: 91/57 (03-24-18 @ 22:00) (91/57 - 109/65)  BP(mean): 68 (03-24-18 @ 22:00) (68 - 81)  ABP: --  ABP(mean): --  RR: 16 (03-24-18 @ 22:00) (16 - 16)  SpO2: 98% (03-24-18 @ 20:00) (98% - 98%)  Wt(kg): --  CVP(mm Hg): --  CVP(cm H2O): --  CO: --  CI: --  PA: --  PA(mean): --  PCWP: --  SVR: --  PVR: --    I&O's Summary    23 Mar 2018 07:01  -  24 Mar 2018 07:00  --------------------------------------------------------  IN: 630 mL / OUT: 0 mL / NET: 630 mL    24 Mar 2018 07:01  -  24 Mar 2018 22:25  --------------------------------------------------------  IN: 820 mL / OUT: 950 mL / NET: -130 mL            ====================  NEW LABS:  ====================                          13.2   5.8   )-----------( 176      ( 24 Mar 2018 05:22 )             38.7     03-24    139  |  102  |  13  ----------------------------<  106<H>  4.3   |  25  |  0.84    Ca    8.9      24 Mar 2018 05:22  Phos  3.4     03-24  Mg     2.2     03-24    TPro  6.5  /  Alb  3.7  /  TBili  0.4  /  DBili  x   /  AST  180<H>  /  ALT  86<H>  /  AlkPhos  49  03-24    PT/INR - ( 24 Mar 2018 05:22 )   PT: 12.5 sec;   INR: 1.15 ratio         PTT - ( 24 Mar 2018 05:22 )  PTT:29.6 sec    ====================  PLAN:  ====================  #CV: IWSTEMI s/p AUDREY x3  -continue aspirin, plavix, atorvastatin   -continue toprol 25mg QD today. Hemodynamically stable  -started captopril 6.25mg TID, increase as tolerates  -cardiac enzymes trended down  -No plan for staged PCI  -replete K and Mg as necessary

## 2018-03-25 ENCOUNTER — TRANSCRIPTION ENCOUNTER (OUTPATIENT)
Age: 58
End: 2018-03-25

## 2018-03-25 VITALS — WEIGHT: 182.54 LBS

## 2018-03-25 LAB
ALBUMIN SERPL ELPH-MCNC: 4 G/DL — SIGNIFICANT CHANGE UP (ref 3.3–5)
ALP SERPL-CCNC: 51 U/L — SIGNIFICANT CHANGE UP (ref 40–120)
ALT FLD-CCNC: 62 U/L RC — HIGH (ref 10–45)
ANION GAP SERPL CALC-SCNC: 13 MMOL/L — SIGNIFICANT CHANGE UP (ref 5–17)
APTT BLD: 28.6 SEC — SIGNIFICANT CHANGE UP (ref 27.5–37.4)
AST SERPL-CCNC: 88 U/L — HIGH (ref 10–40)
BILIRUB SERPL-MCNC: 0.4 MG/DL — SIGNIFICANT CHANGE UP (ref 0.2–1.2)
BUN SERPL-MCNC: 15 MG/DL — SIGNIFICANT CHANGE UP (ref 7–23)
CALCIUM SERPL-MCNC: 9.1 MG/DL — SIGNIFICANT CHANGE UP (ref 8.4–10.5)
CHLORIDE SERPL-SCNC: 102 MMOL/L — SIGNIFICANT CHANGE UP (ref 96–108)
CO2 SERPL-SCNC: 23 MMOL/L — SIGNIFICANT CHANGE UP (ref 22–31)
CREAT SERPL-MCNC: 0.83 MG/DL — SIGNIFICANT CHANGE UP (ref 0.5–1.3)
GLUCOSE SERPL-MCNC: 108 MG/DL — HIGH (ref 70–99)
HCT VFR BLD CALC: 38.6 % — LOW (ref 39–50)
HGB BLD-MCNC: 13.4 G/DL — SIGNIFICANT CHANGE UP (ref 13–17)
INR BLD: 1.16 RATIO — SIGNIFICANT CHANGE UP (ref 0.88–1.16)
MAGNESIUM SERPL-MCNC: 2.2 MG/DL — SIGNIFICANT CHANGE UP (ref 1.6–2.6)
MCHC RBC-ENTMCNC: 29.5 PG — SIGNIFICANT CHANGE UP (ref 27–34)
MCHC RBC-ENTMCNC: 34.6 GM/DL — SIGNIFICANT CHANGE UP (ref 32–36)
MCV RBC AUTO: 85.3 FL — SIGNIFICANT CHANGE UP (ref 80–100)
PHOSPHATE SERPL-MCNC: 3.7 MG/DL — SIGNIFICANT CHANGE UP (ref 2.5–4.5)
PLATELET # BLD AUTO: 176 K/UL — SIGNIFICANT CHANGE UP (ref 150–400)
POTASSIUM SERPL-MCNC: 4.4 MMOL/L — SIGNIFICANT CHANGE UP (ref 3.5–5.3)
POTASSIUM SERPL-SCNC: 4.4 MMOL/L — SIGNIFICANT CHANGE UP (ref 3.5–5.3)
PROT SERPL-MCNC: 6.9 G/DL — SIGNIFICANT CHANGE UP (ref 6–8.3)
PROTHROM AB SERPL-ACNC: 12.7 SEC — SIGNIFICANT CHANGE UP (ref 9.8–12.7)
RBC # BLD: 4.53 M/UL — SIGNIFICANT CHANGE UP (ref 4.2–5.8)
RBC # FLD: 12 % — SIGNIFICANT CHANGE UP (ref 10.3–14.5)
SODIUM SERPL-SCNC: 138 MMOL/L — SIGNIFICANT CHANGE UP (ref 135–145)
WBC # BLD: 5.3 K/UL — SIGNIFICANT CHANGE UP (ref 3.8–10.5)
WBC # FLD AUTO: 5.3 K/UL — SIGNIFICANT CHANGE UP (ref 3.8–10.5)

## 2018-03-25 PROCEDURE — 92921: CPT | Mod: RC

## 2018-03-25 PROCEDURE — 85610 PROTHROMBIN TIME: CPT

## 2018-03-25 PROCEDURE — C1725: CPT

## 2018-03-25 PROCEDURE — 99239 HOSP IP/OBS DSCHRG MGMT >30: CPT

## 2018-03-25 PROCEDURE — 84100 ASSAY OF PHOSPHORUS: CPT

## 2018-03-25 PROCEDURE — 84443 ASSAY THYROID STIM HORMONE: CPT

## 2018-03-25 PROCEDURE — 82553 CREATINE MB FRACTION: CPT

## 2018-03-25 PROCEDURE — 80053 COMPREHEN METABOLIC PANEL: CPT

## 2018-03-25 PROCEDURE — 85027 COMPLETE CBC AUTOMATED: CPT

## 2018-03-25 PROCEDURE — 93005 ELECTROCARDIOGRAM TRACING: CPT

## 2018-03-25 PROCEDURE — C1894: CPT

## 2018-03-25 PROCEDURE — 80048 BASIC METABOLIC PNL TOTAL CA: CPT

## 2018-03-25 PROCEDURE — C9606: CPT | Mod: RC

## 2018-03-25 PROCEDURE — C1887: CPT

## 2018-03-25 PROCEDURE — 80061 LIPID PANEL: CPT

## 2018-03-25 PROCEDURE — 93458 L HRT ARTERY/VENTRICLE ANGIO: CPT | Mod: 59

## 2018-03-25 PROCEDURE — 83880 ASSAY OF NATRIURETIC PEPTIDE: CPT

## 2018-03-25 PROCEDURE — C1757: CPT

## 2018-03-25 PROCEDURE — 83735 ASSAY OF MAGNESIUM: CPT

## 2018-03-25 PROCEDURE — 85730 THROMBOPLASTIN TIME PARTIAL: CPT

## 2018-03-25 PROCEDURE — C1874: CPT

## 2018-03-25 PROCEDURE — 93306 TTE W/DOPPLER COMPLETE: CPT

## 2018-03-25 PROCEDURE — 82550 ASSAY OF CK (CPK): CPT

## 2018-03-25 PROCEDURE — 83036 HEMOGLOBIN GLYCOSYLATED A1C: CPT

## 2018-03-25 PROCEDURE — C1769: CPT

## 2018-03-25 PROCEDURE — 84484 ASSAY OF TROPONIN QUANT: CPT

## 2018-03-25 RX ORDER — SIMVASTATIN 20 MG/1
1 TABLET, FILM COATED ORAL
Qty: 0 | Refills: 0 | COMMUNITY

## 2018-03-25 RX ORDER — LISINOPRIL 2.5 MG/1
1 TABLET ORAL
Qty: 30 | Refills: 1
Start: 2018-03-25 | End: 2018-05-23

## 2018-03-25 RX ORDER — CLOPIDOGREL BISULFATE 75 MG/1
1 TABLET, FILM COATED ORAL
Qty: 30 | Refills: 2
Start: 2018-03-25 | End: 2018-06-22

## 2018-03-25 RX ORDER — ASPIRIN/CALCIUM CARB/MAGNESIUM 324 MG
1 TABLET ORAL
Qty: 0 | Refills: 0 | COMMUNITY

## 2018-03-25 RX ORDER — METOPROLOL TARTRATE 50 MG
1 TABLET ORAL
Qty: 30 | Refills: 3
Start: 2018-03-25 | End: 2018-07-22

## 2018-03-25 RX ORDER — ATORVASTATIN CALCIUM 80 MG/1
1 TABLET, FILM COATED ORAL
Qty: 30 | Refills: 2
Start: 2018-03-25 | End: 2018-06-22

## 2018-03-25 RX ORDER — ASPIRIN/CALCIUM CARB/MAGNESIUM 324 MG
1 TABLET ORAL
Qty: 30 | Refills: 2
Start: 2018-03-25 | End: 2018-06-22

## 2018-03-25 RX ORDER — LISINOPRIL 2.5 MG/1
2.5 TABLET ORAL DAILY
Qty: 0 | Refills: 0 | Status: DISCONTINUED | OUTPATIENT
Start: 2018-03-25 | End: 2018-03-25

## 2018-03-25 RX ADMIN — Medication 25 MILLIGRAM(S): at 05:56

## 2018-03-25 RX ADMIN — HEPARIN SODIUM 5000 UNIT(S): 5000 INJECTION INTRAVENOUS; SUBCUTANEOUS at 05:56

## 2018-03-25 RX ADMIN — LISINOPRIL 2.5 MILLIGRAM(S): 2.5 TABLET ORAL at 11:45

## 2018-03-25 RX ADMIN — CLOPIDOGREL BISULFATE 75 MILLIGRAM(S): 75 TABLET, FILM COATED ORAL at 11:46

## 2018-03-25 RX ADMIN — CAPTOPRIL 6.25 MILLIGRAM(S): 12.5 TABLET ORAL at 05:56

## 2018-03-25 RX ADMIN — Medication 81 MILLIGRAM(S): at 11:46

## 2018-03-25 NOTE — DISCHARGE NOTE ADULT - MEDICATION SUMMARY - MEDICATIONS TO TAKE
I will START or STAY ON the medications listed below when I get home from the hospital:    aspirin 81 mg oral delayed release tablet  -- 1 tab(s) by mouth once a day  -- Indication: For STEMI involving right coronary artery    lisinopril 2.5 mg oral tablet  -- 1 tab(s) by mouth once a day  -- Indication: For STEMI involving right coronary artery    atorvastatin 80 mg oral tablet  -- 1 tab(s) by mouth once a day (at bedtime)  -- Indication: For ST elevation myocardial infarction (STEMI)    clopidogrel 75 mg oral tablet  -- 1 tab(s) by mouth once a day  -- Indication: For STEMI involving right coronary artery    metoprolol succinate 25 mg oral tablet, extended release  -- 1 tab(s) by mouth once a day  -- Indication: For ST elevation myocardial infarction (STEMI)

## 2018-03-25 NOTE — PROGRESS NOTE ADULT - ASSESSMENT
59 y/o M current smoker w/ PMHx of HLD p/w inferior wall STEMI s/p PCI w/ 3x AUDREY to RCA.     #Neuro- Stable, no deficits     #CV- s/p PCI w/ 3 AUDREY to RCA  - started on ASA, Plavix, Atorvastatin  - BP soft overnight, but improving, will start lopressor   - Mao trending down  - TTE shows EF 40-45%    #Pulm- stable, sating well on RA  - smoker, wants to quit, doesn't want an    #GI- tolerating PO, started DASH/TLC     #Renal- Cr stable, urinating w/o difficulty     #Electrolytes- stable, replete Mag<2 or K<4    #Heme- CBC stable, HSQ for DVT ppx    #Endocrine- stable, A1c and TSH wnl
57 y/o M current smoker w/ PMH HLD p/w inferior wall STEMI s/p PCI w/ 3x AUDREY to RCA
57 y/o M current smoker w/ PMH HLD p/w inferior wall STEMI s/p PCI w/ 3x AUDREY to RCA.

## 2018-03-25 NOTE — DISCHARGE NOTE ADULT - HOSPITAL COURSE
57 y/o M current smoker w/ PMHx of HLD presents w/ sub sternal CP found to have inferior wall STEMI s/p PCI w/ 2x AUDREY to RCA, POBA to RPDA

## 2018-03-25 NOTE — DISCHARGE NOTE ADULT - PATIENT PORTAL LINK FT
You can access the [facility] Patient Portal, offered by [facility], by registering with the following website: [email or web address]

## 2018-03-25 NOTE — PROGRESS NOTE ADULT - ATTENDING COMMENTS
start low dose ACE  ambulate  dc planning
Patient is seen and examined with fellow, NP and the CCU house-staff. I agree with the history, physical and the assessment and plan.  ISTEMI s/p PCI to RCA  on DAPT  started on BB  TTE before d/c will d/w interventionalist regarding stgaed pCI for ALD
dc today

## 2018-03-25 NOTE — DISCHARGE NOTE ADULT - CARE PROVIDER_API CALL
Roman Ramirez), Cardiovascular Disease; Interventional Cardiology  [address] [place] [address]  Phone: [phone]  Fax: [phone]

## 2018-03-25 NOTE — DISCHARGE NOTE ADULT - INSTRUCTIONS
low salt, low fat, low cholesterol  No heavy lifting, strenuous activity, bending, straining or unnecessary stair climbing  for 2 weeks. No sex for 1 week.  No driving for 2 days. You may shower 24 hours following procedure but avoid baths and swimming for 1 week. Check groin site for bleeding and/or swelling daily following procedure. Call your doctor/cardiologist immediately should it occur or if you have increased/persistent pain at the site. Follow up with your cardiologist in 1- 2 weeks. You may call [facility] at [phone] or [phone] after office hours and weekends  with any questions or concerns following your procedure. Take medications as prescribed.

## 2018-03-25 NOTE — PROGRESS NOTE ADULT - PROBLEM SELECTOR PLAN 1
Continues in SR no events on tele  C/w DAPT Statin, BB   Add ACEi today   increase activity   Continue to monitor
-continue ASA, Plavix Statin, BB   -change captopril to lisinopril  DC home today

## 2018-03-25 NOTE — DISCHARGE NOTE ADULT - PLAN OF CARE
Coronary artery disease is a condition where the arteries the supply the heart muscle get clogges with fatty deposits & puts you at risk for a heart attack  Call your doctor if you have any new pain, pressure, or discomfort in the center of your chest, pain, tingling or discomfort in arms, back, neck, jaw, or stomach, shortness of breath, nausea, vomiting, burping or heartburn, sweating, cold and clammy skin, racing or abnormal heartbeat for more than 10 minutes or if they keep coming & going.  Call 911 and do not tr to get to hospital by care  You can help yourself with lefestyle changes (quitting smoking if you smoke), eat lots of fruits & vegetables & low fat dairy products, not a lot of meat & fatty foods, walk or some form of physical activity most days of the week, lose weight if you are overweight  Take your cardiac medication as prescribed to lower cholesterol, to lower blood pressure, aspirin to prevent blood clots, and diabetes control  Make sure to keep appointments with doctor for cardiac follow up care Low salt, low fat diet.   Weight management.   Take medications as prescribed.    No smoking.  Follow up appointments with your doctor(s)  as instructed. Your LDL cholesterol will be less than 70mg/dL Continue with your cholesterol medications. Eat a heart healthy diet that is low in saturated fats and salt, and includes whole grains, fruits, vegetables and lean protein; exercise regularly (consult with your physician or cardiologist first); maintain a heart healthy weight; if you smoke - quit (A resource to help you stop smoking is the [facility] Tobacco [facility] – phone number [phone].). Continue to follow with your primary physician or cardiologist. You will continue not to smoke. If you are on medication to help you quit smoking, be sure to take it as prescribed. Find healthy ways to deal with stress, such as exercise (check with your healthcare provider first), deep breathing, meditation, or enjoyable healthy hobbies.  Avoid situations that may cause you to smoke a cigarette.  Look for help with quitting; you are not alone. A resource to help you stop smoking is the [facility] for Tobacco Control – phone number [phone].

## 2018-03-25 NOTE — DISCHARGE NOTE ADULT - CARE PLAN
Principal Discharge DX:	STEMI involving right coronary artery  Goal:	Coronary artery disease is a condition where the arteries the supply the heart muscle get clogges with fatty deposits & puts you at risk for a heart attack  Call your doctor if you have any new pain, pressure, or discomfort in the center of your chest, pain, tingling or discomfort in arms, back, neck, jaw, or stomach, shortness of breath, nausea, vomiting, burping or heartburn, sweating, cold and clammy skin, racing or abnormal heartbeat for more than 10 minutes or if they keep coming & going.  Call 911 and do not tr to get to hospital by care  You can help yourself with lefestyle changes (quitting smoking if you smoke), eat lots of fruits & vegetables & low fat dairy products, not a lot of meat & fatty foods, walk or some form of physical activity most days of the week, lose weight if you are overweight  Take your cardiac medication as prescribed to lower cholesterol, to lower blood pressure, aspirin to prevent blood clots, and diabetes control  Make sure to keep appointments with doctor for cardiac follow up care  Assessment and plan of treatment:	Low salt, low fat diet.   Weight management.   Take medications as prescribed.    No smoking.  Follow up appointments with your doctor(s)  as instructed.  Secondary Diagnosis:	Hyperlipidemia, unspecified hyperlipidemia type  Goal:	Your LDL cholesterol will be less than 70mg/dL  Assessment and plan of treatment:	Continue with your cholesterol medications. Eat a heart healthy diet that is low in saturated fats and salt, and includes whole grains, fruits, vegetables and lean protein; exercise regularly (consult with your physician or cardiologist first); maintain a heart healthy weight; if you smoke - quit (A resource to help you stop smoking is the [facility] [email or web address] for Tobacco Control – phone number [phone].). Continue to follow with your primary physician or cardiologist.  Secondary Diagnosis:	Smoker  Goal:	You will continue not to smoke.  Assessment and plan of treatment:	If you are on medication to help you quit smoking, be sure to take it as prescribed. Find healthy ways to deal with stress, such as exercise (check with your healthcare provider first), deep breathing, meditation, or enjoyable healthy hobbies.  Avoid situations that may cause you to smoke a cigarette.  Look for help with quitting; you are not alone. A resource to help you stop smoking is the [facility] – phone number [phone].

## 2018-03-25 NOTE — PROGRESS NOTE ADULT - SUBJECTIVE AND OBJECTIVE BOX
Patient is a 58y old  Male who presents with a chief complaint of Chest pain (22 Mar 2018 14:25)      Overnight Event:    REVIEW OF SYSTEMS:  	    MEDICATIONS  (STANDING):  aspirin enteric coated 81 milliGRAM(s) Oral daily  atorvastatin 80 milliGRAM(s) Oral at bedtime  captopril 6.25 milliGRAM(s) Oral three times a day  clopidogrel Tablet 75 milliGRAM(s) Oral daily  heparin  Injectable 5000 Unit(s) SubCutaneous every 8 hours  influenza   Vaccine 0.5 milliLiter(s) IntraMuscular once  metoprolol succinate ER 25 milliGRAM(s) Oral daily    MEDICATIONS  (PRN):        PHYSICAL EXAM:  Vital Signs Last 24 Hrs  T(C): 37.1 (24 Mar 2018 20:00), Max: 37.1 (24 Mar 2018 20:00)  T(F): 98.7 (24 Mar 2018 20:00), Max: 98.7 (24 Mar 2018 20:00)  HR: 76 (25 Mar 2018 06:00) (62 - 84)  BP: 105/63 (25 Mar 2018 06:00) (76/41 - 118/78)  BP(mean): 80 (25 Mar 2018 06:00) (51 - 89)  RR: 16 (25 Mar 2018 06:00) (16 - 18)  SpO2: 96% (25 Mar 2018 05:56) (96% - 98%)  I&O's Summary    24 Mar 2018 07:01  -  25 Mar 2018 07:00  --------------------------------------------------------  IN: 940 mL / OUT: 950 mL / NET: -10 mL        Appearance: Normal	  HEENT:   Normal oral mucosa, PERRL, EOMI	  Lymphatic: No lymphadenopathy  Cardiovascular: Normal S1 S2, No JVD, No murmurs, No edema  Respiratory: Lungs clear to auscultation	  Psychiatry: A & O x 3, Mood & affect appropriate  Gastrointestinal:  Soft, Non-tender, + BS	  Skin: No rashes, No ecchymoses, No cyanosis	  Neurologic: Non-focal  Extremities: Normal range of motion, No clubbing, cyanosis or edema  Vascular: Peripheral pulses palpable 2+ bilaterally    LABS:	 	                        13.4   5.3   )-----------( 176      ( 25 Mar 2018 06:00 )             38.6     Auto Eosinophil # x     / Auto Eosinophil % x     / Auto Neutrophil # x     / Auto Neutrophil % x     / BANDS % x                            13.2   5.8   )-----------( 176      ( 24 Mar 2018 05:22 )             38.7     Auto Eosinophil # x     / Auto Eosinophil % x     / Auto Neutrophil # x     / Auto Neutrophil % x     / BANDS % x        INR: 1.16 ratio (03-25 @ 06:00)  INR: 1.15 ratio (03-24 @ 05:22)  INR: 1.17 ratio (03-23 @ 05:21)    03-25    138  |  102  |  15  ----------------------------<  108<H>  4.4   |  23  |  0.83  03-24    139  |  102  |  13  ----------------------------<  106<H>  4.3   |  25  |  0.84    Ca    9.1      25 Mar 2018 06:00  Mg     2.2     03-25  Phos  3.7     03-25  TPro  6.9  /  Alb  4.0  /  TBili  0.4  /  DBili  x   /  AST  88<H>  /  ALT  62<H>  /  AlkPhos  51  03-25  TPro  6.5  /  Alb  3.7  /  TBili  0.4  /  DBili  x   /  AST  180<H>  /  ALT  86<H>  /  AlkPhos  49  03-24        proBNP: Serum Pro-Brain Natriuretic Peptide: 129 pg/mL (03-22 @ 14:20)    Lipid Profile: 03-23 Chol 167 LDL 92 HDL 25<L> Trig 252<H>  HgA1c: 5.6 % (03-23 @ 04:52)    TSH: Thyroid Stimulating Hormone, Serum: 2.34 uIU/mL (03-23 @ 04:52)      CARDIAC MARKERS:   23 Mar 2018 05:21 Troponin 5.70 ng/mL / Creatine Kinase 3594 U/L /  CKMB 295.9 ng/mL / CPK Mass Assay % 8.2 %   22 Mar 2018 22:05 Troponin 7.98 ng/mL / Creatine Kinase 5028 U/L /  CKMB 507.2 ng/mL / CPK Mass Assay % 10.1 %   22 Mar 2018 14:20 Troponin 19.20 ng/mL / Creatine Kinase 5301 U/L /  CKMB 573.5 ng/mL / CPK Mass Assay % 10.8 %        TELEMETRY: 	    ECG:  	  RADIOLOGY:  OTHER: 	    PREVIOUS DIAGNOSTIC TESTING:    [ ] Echocardiogram:  [ ]  Catheterization:  [ ] Stress Test:  	  	  FRIDA Kirk  Contact # Patient is a 58y old  Male who presents with a chief complaint of Chest pain (22 Mar 2018 14:25)  HPI:  57 y/o M current smoker w/ PMHx of HLD p/w chest pain.   Patient reports waking up this morning feeling well. He then reports shoveling snow without any difficulties and then getting in his car. While he was driving his car, he reports feeling substernal chest pain at around 8am. He reports the pain as needles in his sub sternum that was 4 or 5/10 in intensity, didn't move around or radiate anywhere. At this time he reports just not feeling well, but couldn't specify anything beyond that.   Denies any SOB, palpitations, diaphoresis, F/C, headaches, N/V. No recent travel or sick contacts.   He reports initially driving to his PCP in [place] while having the CP, but his office wasn't open, so he then drove to his brothers house. He brother then drove him to [facility]. There they diagnosed  him with a STEMI and transferred him to [facility] for an emergent cath.   Initial labs at OSH showed Trop 0.147, . Loaded with ASA 325mg, Plavix 600mg, Heparin bolus (22 Mar 2018 14:25)      Overnight Event: No overnight issues.  Ambulating without difficulty    REVIEW OF SYSTEMS: No SOB or chest pain  	    MEDICATIONS  (STANDING):  aspirin enteric coated 81 milliGRAM(s) Oral daily  atorvastatin 80 milliGRAM(s) Oral at bedtime  captopril 6.25 milliGRAM(s) Oral three times a day  clopidogrel Tablet 75 milliGRAM(s) Oral daily  heparin  Injectable 5000 Unit(s) SubCutaneous every 8 hours  influenza   Vaccine 0.5 milliLiter(s) IntraMuscular once  metoprolol succinate ER 25 milliGRAM(s) Oral daily    MEDICATIONS  (PRN):        PHYSICAL EXAM:  Vital Signs Last 24 Hrs  T(C): 37.1 (24 Mar 2018 20:00), Max: 37.1 (24 Mar 2018 20:00)  T(F): 98.7 (24 Mar 2018 20:00), Max: 98.7 (24 Mar 2018 20:00)  HR: 76 (25 Mar 2018 06:00) (62 - 84)  BP: 105/63 (25 Mar 2018 06:00) (76/41 - 118/78)  BP(mean): 80 (25 Mar 2018 06:00) (51 - 89)  RR: 16 (25 Mar 2018 06:00) (16 - 18)  SpO2: 96% (25 Mar 2018 05:56) (96% - 98%)  I&O's Summary    24 Mar 2018 07:01  -  25 Mar 2018 07:00  --------------------------------------------------------  IN: 940 mL / OUT: 950 mL / NET: -10 mL        Appearance: Normal	  HEENT:   Normal oral mucosa, PERRL, EOMI	  Lymphatic: No lymphadenopathy  Cardiovascular: Normal S1 S2, No JVD, No murmurs, No edema  Respiratory: Lungs clear to auscultation	  Psychiatry: A & O x 3, Mood & affect appropriate  Gastrointestinal:  Soft, Non-tender, + BS	  Skin: No rashes, No ecchymoses, No cyanosis	  Neurologic: Non-focal  Extremities: Normal range of motion, No clubbing, cyanosis or edema  Vascular: Peripheral pulses palpable 2+ bilaterally    LABS:	 	                        13.4   5.3   )-----------( 176      ( 25 Mar 2018 06:00 )             38.6     Auto Eosinophil # x     / Auto Eosinophil % x     / Auto Neutrophil # x     / Auto Neutrophil % x     / BANDS % x                            13.2   5.8   )-----------( 176      ( 24 Mar 2018 05:22 )             38.7     Auto Eosinophil # x     / Auto Eosinophil % x     / Auto Neutrophil # x     / Auto Neutrophil % x     / BANDS % x        INR: 1.16 ratio (03-25 @ 06:00)  INR: 1.15 ratio (03-24 @ 05:22)  INR: 1.17 ratio (03-23 @ 05:21)    03-25    138  |  102  |  15  ----------------------------<  108<H>  4.4   |  23  |  0.83  03-24    139  |  102  |  13  ----------------------------<  106<H>  4.3   |  25  |  0.84    Ca    9.1      25 Mar 2018 06:00  Mg     2.2     03-25  Phos  3.7     03-25  TPro  6.9  /  Alb  4.0  /  TBili  0.4  /  DBili  x   /  AST  88<H>  /  ALT  62<H>  /  AlkPhos  51  03-25  TPro  6.5  /  Alb  3.7  /  TBili  0.4  /  DBili  x   /  AST  180<H>  /  ALT  86<H>  /  AlkPhos  49  03-24        proBNP: Serum Pro-Brain Natriuretic Peptide: 129 pg/mL (03-22 @ 14:20)    Lipid Profile: 03-23 Chol 167 LDL 92 HDL 25<L> Trig 252<H>  HgA1c: 5.6 % (03-23 @ 04:52)    TSH: Thyroid Stimulating Hormone, Serum: 2.34 uIU/mL (03-23 @ 04:52)      CARDIAC MARKERS:   23 Mar 2018 05:21 Troponin 5.70 ng/mL / Creatine Kinase 3594 U/L /  CKMB 295.9 ng/mL / CPK Mass Assay % 8.2 %   22 Mar 2018 22:05 Troponin 7.98 ng/mL / Creatine Kinase 5028 U/L /  CKMB 507.2 ng/mL / CPK Mass Assay % 10.1 %   22 Mar 2018 14:20 Troponin 19.20 ng/mL / Creatine Kinase 5301 U/L /  CKMB 573.5 ng/mL / CPK Mass Assay % 10.8 %        TELEMETRY: No ectopy  ECG:  	< from: 12 Lead ECG  NORMAL SINUS RHYTHM  INFERIOR INFARCT , AGE UNDETERMINED  ST & T WAVE ABNORMALITY, CONSIDER LATERAL ISCHEMIA  ABNORMAL ECG    < end of copied text >	    PREVIOUS DIAGNOSTIC TESTING:    [ ] Echocardiogram: < from: Transthoracic Echocardiogram (03.22.18 @ 15:12) >  Conclusions: EF 40-45%  1. Mild-moderate segmental left ventricular systolic  dysfunction. The inferolateral wall, and the inferior wall  are hypokinetic.  2. Low Normal right ventricular size and function. TAPSE  1.6cm  3. Estimated pulmonary artery systolic pressure equals 27  mm Hg, assuming right atrial pressure equals 8  mm Hg,  consistent with normal pulmonary pressures.    < end of copied text >    [ ]  Catheterization: < from: Cardiac Cath Lab - Adult (03.22.18 @ 11:30) >  CORONARY VESSELS: The coronary circulation is right dominant.  LM:   --  LM: Angiography showed minor luminal irregularities with no flow  limiting lesions.  LAD:   --  Mid LAD: There was a 25 % stenosis.  --  D1: There was a 50 % stenosis.  CX:   --  OM1: There was a 100 % stenosis.  RCA:   --  Proximal RCA: There was a 80 % stenosis.  --  Mid RCA: There was a 100 % stenosis.  --  RPDA: There was a 80 % stenosis.  COMPLICATIONS: There were no complications.  DIAGNOSTIC RECOMMENDATIONS: ASA and Plavix for 1 year.  INTERVENTIONAL RECOMMENDATIONS: ASA and Plavix for 1 year.    < end of copied text >      [facility]  Contact # [phone]

## 2018-03-26 PROBLEM — E78.5 HYPERLIPIDEMIA, UNSPECIFIED: Chronic | Status: ACTIVE | Noted: 2018-03-22

## 2018-04-03 DIAGNOSIS — I10 ESSENTIAL (PRIMARY) HYPERTENSION: ICD-10-CM

## 2018-04-03 DIAGNOSIS — I21.3 ST ELEVATION (STEMI) MYOCARDIAL INFARCTION OF UNSPECIFIED SITE: ICD-10-CM

## 2018-04-03 DIAGNOSIS — F17.200 NICOTINE DEPENDENCE, UNSPECIFIED, UNCOMPLICATED: ICD-10-CM

## 2018-04-03 PROBLEM — Z00.00 ENCOUNTER FOR PREVENTIVE HEALTH EXAMINATION: Status: ACTIVE | Noted: 2018-04-03

## 2018-04-14 ENCOUNTER — NON-APPOINTMENT (OUTPATIENT)
Age: 58
End: 2018-04-14

## 2018-04-14 ENCOUNTER — APPOINTMENT (OUTPATIENT)
Dept: CARDIOLOGY | Facility: CLINIC | Age: 58
End: 2018-04-14
Payer: MEDICAID

## 2018-04-14 VITALS
WEIGHT: 180 LBS | DIASTOLIC BLOOD PRESSURE: 82 MMHG | HEIGHT: 65 IN | OXYGEN SATURATION: 97 % | BODY MASS INDEX: 29.99 KG/M2 | SYSTOLIC BLOOD PRESSURE: 129 MMHG | HEART RATE: 78 BPM

## 2018-04-14 DIAGNOSIS — E78.5 HYPERLIPIDEMIA, UNSPECIFIED: ICD-10-CM

## 2018-04-14 PROCEDURE — 99214 OFFICE O/P EST MOD 30 MIN: CPT

## 2018-04-14 PROCEDURE — 93000 ELECTROCARDIOGRAM COMPLETE: CPT

## 2018-05-01 ENCOUNTER — OUTPATIENT (OUTPATIENT)
Dept: OUTPATIENT SERVICES | Facility: HOSPITAL | Age: 58
LOS: 1 days | End: 2018-05-01
Payer: MEDICAID

## 2018-05-01 PROCEDURE — G9001: CPT

## 2018-05-07 ENCOUNTER — OUTPATIENT (OUTPATIENT)
Dept: OUTPATIENT SERVICES | Facility: HOSPITAL | Age: 58
LOS: 1 days | End: 2018-05-07
Payer: MEDICAID

## 2018-05-07 ENCOUNTER — APPOINTMENT (OUTPATIENT)
Dept: CV DIAGNOSITCS | Facility: HOSPITAL | Age: 58
End: 2018-05-07

## 2018-05-07 DIAGNOSIS — E78.5 HYPERLIPIDEMIA, UNSPECIFIED: ICD-10-CM

## 2018-05-07 PROCEDURE — 93306 TTE W/DOPPLER COMPLETE: CPT

## 2018-05-07 PROCEDURE — 93306 TTE W/DOPPLER COMPLETE: CPT | Mod: 26

## 2018-05-09 ENCOUNTER — EMERGENCY (EMERGENCY)
Facility: HOSPITAL | Age: 58
LOS: 1 days | Discharge: HOME | End: 2018-05-09
Attending: EMERGENCY MEDICINE
Payer: MEDICAID

## 2018-05-09 VITALS
HEIGHT: 65 IN | HEART RATE: 62 BPM | OXYGEN SATURATION: 99 % | SYSTOLIC BLOOD PRESSURE: 102 MMHG | RESPIRATION RATE: 16 BRPM | WEIGHT: 179.9 LBS | TEMPERATURE: 98 F | DIASTOLIC BLOOD PRESSURE: 68 MMHG

## 2018-05-09 VITALS
TEMPERATURE: 98 F | OXYGEN SATURATION: 98 % | RESPIRATION RATE: 16 BRPM | HEART RATE: 60 BPM | SYSTOLIC BLOOD PRESSURE: 110 MMHG | DIASTOLIC BLOOD PRESSURE: 59 MMHG

## 2018-05-09 LAB
ALBUMIN SERPL ELPH-MCNC: 4.3 G/DL — SIGNIFICANT CHANGE UP (ref 3.3–5)
ALP SERPL-CCNC: 52 U/L — SIGNIFICANT CHANGE UP (ref 40–120)
ALT FLD-CCNC: 25 U/L — SIGNIFICANT CHANGE UP (ref 10–45)
ANION GAP SERPL CALC-SCNC: 13 MMOL/L — SIGNIFICANT CHANGE UP (ref 5–17)
APTT BLD: 27 SEC — LOW (ref 27.5–37.4)
AST SERPL-CCNC: 19 U/L — SIGNIFICANT CHANGE UP (ref 10–40)
BASOPHILS # BLD AUTO: 0 K/UL — SIGNIFICANT CHANGE UP (ref 0–0.2)
BASOPHILS NFR BLD AUTO: 0.7 % — SIGNIFICANT CHANGE UP (ref 0–2)
BILIRUB SERPL-MCNC: 0.4 MG/DL — SIGNIFICANT CHANGE UP (ref 0.2–1.2)
BUN SERPL-MCNC: 14 MG/DL — SIGNIFICANT CHANGE UP (ref 7–23)
CALCIUM SERPL-MCNC: 9.1 MG/DL — SIGNIFICANT CHANGE UP (ref 8.4–10.5)
CHLORIDE SERPL-SCNC: 102 MMOL/L — SIGNIFICANT CHANGE UP (ref 96–108)
CK MB BLD-MCNC: 3 % — SIGNIFICANT CHANGE UP (ref 0–3.5)
CK MB CFR SERPL CALC: 2.6 NG/ML — SIGNIFICANT CHANGE UP (ref 0–6.7)
CK SERPL-CCNC: 88 U/L — SIGNIFICANT CHANGE UP (ref 30–200)
CO2 SERPL-SCNC: 25 MMOL/L — SIGNIFICANT CHANGE UP (ref 22–31)
CREAT SERPL-MCNC: 0.88 MG/DL — SIGNIFICANT CHANGE UP (ref 0.5–1.3)
EOSINOPHIL # BLD AUTO: 0.2 K/UL — SIGNIFICANT CHANGE UP (ref 0–0.5)
EOSINOPHIL NFR BLD AUTO: 3 % — SIGNIFICANT CHANGE UP (ref 0–6)
GLUCOSE SERPL-MCNC: 83 MG/DL — SIGNIFICANT CHANGE UP (ref 70–99)
HCT VFR BLD CALC: 40.8 % — SIGNIFICANT CHANGE UP (ref 39–50)
HGB BLD-MCNC: 13.9 G/DL — SIGNIFICANT CHANGE UP (ref 13–17)
INR BLD: 1.19 RATIO — HIGH (ref 0.88–1.16)
LIDOCAIN IGE QN: 33 U/L — SIGNIFICANT CHANGE UP (ref 7–60)
LYMPHOCYTES # BLD AUTO: 2 K/UL — SIGNIFICANT CHANGE UP (ref 1–3.3)
LYMPHOCYTES # BLD AUTO: 37.4 % — SIGNIFICANT CHANGE UP (ref 13–44)
MCHC RBC-ENTMCNC: 28.5 PG — SIGNIFICANT CHANGE UP (ref 27–34)
MCHC RBC-ENTMCNC: 34.1 GM/DL — SIGNIFICANT CHANGE UP (ref 32–36)
MCV RBC AUTO: 83.5 FL — SIGNIFICANT CHANGE UP (ref 80–100)
MONOCYTES # BLD AUTO: 0.5 K/UL — SIGNIFICANT CHANGE UP (ref 0–0.9)
MONOCYTES NFR BLD AUTO: 9.6 % — SIGNIFICANT CHANGE UP (ref 2–14)
NEUTROPHILS # BLD AUTO: 2.7 K/UL — SIGNIFICANT CHANGE UP (ref 1.8–7.4)
NEUTROPHILS NFR BLD AUTO: 49.3 % — SIGNIFICANT CHANGE UP (ref 43–77)
NT-PROBNP SERPL-SCNC: 775 PG/ML — HIGH (ref 0–300)
PLATELET # BLD AUTO: 192 K/UL — SIGNIFICANT CHANGE UP (ref 150–400)
POTASSIUM SERPL-MCNC: 4.1 MMOL/L — SIGNIFICANT CHANGE UP (ref 3.5–5.3)
POTASSIUM SERPL-SCNC: 4.1 MMOL/L — SIGNIFICANT CHANGE UP (ref 3.5–5.3)
PROT SERPL-MCNC: 7 G/DL — SIGNIFICANT CHANGE UP (ref 6–8.3)
PROTHROM AB SERPL-ACNC: 12.9 SEC — HIGH (ref 9.8–12.7)
RBC # BLD: 4.88 M/UL — SIGNIFICANT CHANGE UP (ref 4.2–5.8)
RBC # FLD: 12 % — SIGNIFICANT CHANGE UP (ref 10.3–14.5)
SODIUM SERPL-SCNC: 140 MMOL/L — SIGNIFICANT CHANGE UP (ref 135–145)
TROPONIN T SERPL-MCNC: <0.01 NG/ML — SIGNIFICANT CHANGE UP (ref 0–0.06)
TROPONIN T SERPL-MCNC: <0.01 NG/ML — SIGNIFICANT CHANGE UP (ref 0–0.06)
WBC # BLD: 5.4 K/UL — SIGNIFICANT CHANGE UP (ref 3.8–10.5)
WBC # FLD AUTO: 5.4 K/UL — SIGNIFICANT CHANGE UP (ref 3.8–10.5)

## 2018-05-09 PROCEDURE — 93308 TTE F-UP OR LMTD: CPT | Mod: 26

## 2018-05-09 PROCEDURE — 71046 X-RAY EXAM CHEST 2 VIEWS: CPT | Mod: 26

## 2018-05-09 PROCEDURE — A9537: CPT

## 2018-05-09 PROCEDURE — 71046 X-RAY EXAM CHEST 2 VIEWS: CPT

## 2018-05-09 PROCEDURE — 83690 ASSAY OF LIPASE: CPT

## 2018-05-09 PROCEDURE — 78227 HEPATOBIL SYST IMAGE W/DRUG: CPT | Mod: 26

## 2018-05-09 PROCEDURE — 85610 PROTHROMBIN TIME: CPT

## 2018-05-09 PROCEDURE — 82553 CREATINE MB FRACTION: CPT

## 2018-05-09 PROCEDURE — 78227 HEPATOBIL SYST IMAGE W/DRUG: CPT

## 2018-05-09 PROCEDURE — 76705 ECHO EXAM OF ABDOMEN: CPT | Mod: 26

## 2018-05-09 PROCEDURE — 85730 THROMBOPLASTIN TIME PARTIAL: CPT

## 2018-05-09 PROCEDURE — 93005 ELECTROCARDIOGRAM TRACING: CPT

## 2018-05-09 PROCEDURE — 82550 ASSAY OF CK (CPK): CPT

## 2018-05-09 PROCEDURE — 85027 COMPLETE CBC AUTOMATED: CPT

## 2018-05-09 PROCEDURE — 80053 COMPREHEN METABOLIC PANEL: CPT

## 2018-05-09 PROCEDURE — 93308 TTE F-UP OR LMTD: CPT

## 2018-05-09 PROCEDURE — 83880 ASSAY OF NATRIURETIC PEPTIDE: CPT

## 2018-05-09 PROCEDURE — 99284 EMERGENCY DEPT VISIT MOD MDM: CPT | Mod: 25

## 2018-05-09 PROCEDURE — 99285 EMERGENCY DEPT VISIT HI MDM: CPT

## 2018-05-09 PROCEDURE — 84484 ASSAY OF TROPONIN QUANT: CPT

## 2018-05-09 PROCEDURE — 76705 ECHO EXAM OF ABDOMEN: CPT

## 2018-05-09 RX ORDER — SODIUM CHLORIDE 9 MG/ML
3 INJECTION INTRAMUSCULAR; INTRAVENOUS; SUBCUTANEOUS ONCE
Qty: 0 | Refills: 0 | Status: COMPLETED | OUTPATIENT
Start: 2018-05-09 | End: 2018-05-09

## 2018-05-09 RX ADMIN — SODIUM CHLORIDE 3 MILLILITER(S): 9 INJECTION INTRAMUSCULAR; INTRAVENOUS; SUBCUTANEOUS at 12:28

## 2018-05-09 NOTE — ED PROVIDER NOTE - PLAN OF CARE
Follow up with Cardiologist within the next 48 hours  Follow up with your Primary Care Physician within the next 2-3 days  Bring a copy of your test results with you to your appointment  Continue your current medication regimen  Return to the Emergency Room if you experience new or worsening symptoms

## 2018-05-09 NOTE — ED PROVIDER NOTE - PHYSICAL EXAMINATION
Attending Paula: Gen: NAD, heent: atrauamtic, eomi, perrla, mmm, op pink, uvula midline, neck; nttp, no nuchal rigidity, chest: nttp, no crepitus, cv: rrr, no murmurs, lungs: ctab, abd: soft, nontender, nondistended, no peritoneal signs, +BS, no guarding, ext: wwp, neg homans, skin: no rash, neuro: awake and alert, following commands, speech clear, sensation and strength intact, no focal deficits

## 2018-05-09 NOTE — ED PROVIDER NOTE - MEDICAL DECISION MAKING DETAILS
Attending Chai: 59 y/o male s/p recent STEMI with stents presenting with epigastric pain. pain started yesterday and currently is resolved. upon arrival ekg shows no st elevation. pocus shows no sig pericardial effusion. no pleuritic pain to suggest pe. pocus did show gallbladder with stone and wall edema. pt pain free. consider biliary colic. however with wall edema and stone near neck HIDA ordered which was negative. will delta troponin and d/w cards. pt placed on cardiac monitor

## 2018-05-09 NOTE — ED ADULT NURSE NOTE - PMH
CAD (coronary artery disease)    Hyperlipidemia, unspecified hyperlipidemia type    Stented coronary artery

## 2018-05-09 NOTE — ED PROVIDER NOTE - CARE PLAN
Principal Discharge DX:	Epigastric pain  Assessment and plan of treatment:	Follow up with Cardiologist within the next 48 hours  Follow up with your Primary Care Physician within the next 2-3 days  Bring a copy of your test results with you to your appointment  Continue your current medication regimen  Return to the Emergency Room if you experience new or worsening symptoms

## 2018-05-09 NOTE — CONSULT NOTE ADULT - SUBJECTIVE AND OBJECTIVE BOX
**********              CARDIOLOGY CONSULT NOTE              ************  ============================================================  CHIEF COMPLAINT/REASON FOR CONSULT:  Patient is a 58y old  Male who presents with a chief complaint of epigastric pain    HISTORY OF PRESENT ILLNESS:  58yMale with a history of HTN, HL, CAD s/p IWSTEMI s/p recent PCI RCA presenting with epigastric pain for which cardiology was consulted.   Briefly patient awoke at 2 am with colicky epigastric pain, not related to position, nor exertion, no assoc CP/SOB/palpitations/diaphoresis. Pain was unrelenting. Has since abated. Much different than prior MI pain.   No EKG changes. Troponins negative.   Found to have Gallstones and GB wall thickening; no clinical evidence of cholecystitis.   Reviewed outside medical records. Obtained history from relevant family members.    ============================================================    ============================================================        Allergies    No Known Allergies    Intolerances    	    MEDICATIONS:                  PAST MEDICAL & SURGICAL HISTORY:  Stented coronary artery  CAD (coronary artery disease)  Hyperlipidemia, unspecified hyperlipidemia type  No significant past surgical history      FAMILY HISTORY:  Family history of coronary artery disease (Sibling)    NC - Mother/Father    SOCIAL HISTORY:    [ x] Non-smoker  [x] No Illicit Drug Use  [ x] No Excess EtOH Use      REVIEW OF SYSTEMS: (Unless + Before Symptom, it is negative)  Constitutional: No Fever, Fatigue, Weight Changes  Eyes: No Recent Vision Changes, Eye Pain  ENT: No Congestion, Sore Throat  Endocrine: No Excess Sweating, Temperature Intolerance  Cardiovascular: No Chest Pain, Palpitations, Shortness of Breath, Pre-syncope, Syncope, LE Edema  Respiratory: No Cough, Congestion, Wheezing  Gastrointestinal: +Abdominal Pain, Nausea, Vomiting  Genitourinary: No dysuria, hematuria  Musculoskeletal: No Joint Pain, Swelling  Neurologic: No headaches, Imbalance, Weakness  Skin: No rashes, hematoma, purprura    ================================    PHYSICAL EXAM:  T(C): 36.6 (05-09-18 @ 19:22), Max: 36.8 (05-09-18 @ 12:00)  HR: 60 (05-09-18 @ 19:22) (60 - 72)  BP: 110/59 (05-09-18 @ 19:22) (102/68 - 122/83)  RR: 16 (05-09-18 @ 19:22) (16 - 16)  SpO2: 98% (05-09-18 @ 19:22) (97% - 99%)  Wt(kg): --  I&O's Summary    Appearance: Normal; NAD	  HEENT:   Normal oral mucosa, EOMI	  Lymphatic: No lymphadenopathy  Cardiovascular: Normal S1 S2, No JVD, No murmurs, No edema  Respiratory: Lungs clear to auscultation, no use of accessory muscles	  Psychiatry: A & O x 3, Mood & affect appropriate  Gastrointestinal:  Soft, Non-tender	  Skin: No rashes, No ecchymoses, No cyanosis	  Neurologic: Non-focal, No Focal Deficits  Extremities: Normal range of motion, No clubbing, cyanosis or edema  Vascular: Peripheral pulses palpable 2+ bilaterally, no prominent varicosities    ============================    LABS:	   Labs [id number] @ 20:41	    CBC Full  -  ( 09 May 2018 12:31 )  WBC Count : 5.4 K/uL  Hemoglobin : 13.9 g/dL  Hematocrit : 40.8 %  Platelet Count - Automated : 192 K/uL  Mean Cell Volume : 83.5 fl  Mean Cell Hemoglobin : 28.5 pg  Mean Cell Hemoglobin Concentration : 34.1 gm/dL  Auto Neutrophil # : 2.7 K/uL  Auto Lymphocyte # : 2.0 K/uL  Auto Monocyte # : 0.5 K/uL  Auto Eosinophil # : 0.2 K/uL  Auto Basophil # : 0.0 K/uL  Auto Neutrophil % : 49.3 %  Auto Lymphocyte % : 37.4 %  Auto Monocyte % : 9.6 %  Auto Eosinophil % : 3.0 %  Auto Basophil % : 0.7 %    05-09    140  |  102  |  14  ----------------------------<  83  4.1   |  25  |  0.88    Ca    9.1      09 May 2018 12:31    TPro  7.0  /  Alb  4.3  /  TBili  0.4  /  DBili  x   /  AST  19  /  ALT  25  /  AlkPhos  52  05-09        =========================================================================    ECG:  	  No ischemic changes    =========================================================================  ASSESSMENT:  58yMale with a history of HTN, HL, CAD s/p IWSTEMI s/p recent PCI RCA presenting with epigastric pain for which cardiology was consulted. EKG/Trop negative/pain is not present.   Non-cardiac pain. Highly probable it is symptomatic cholelithiasis.     Recommendations  - Continue current cardiac meds  - Re-assurance  - If patient is to undergo procedure, please re-consult cardiology  - Thank you for this consult.      Please call with questions.     Fredo Freed MD, [facility]  [phone]            Total time spent in face-to-face encounter was 80 minutes. > 50 minutes spent in counseling and coordination of care addressing all medical issues listed above. All labs, imaging, consultant reports, and any relevant outside medical records personally reviewed in order to evaluate and manage the patient medically as well as provide coordination of care amongst providers.

## 2018-05-09 NOTE — ED ADULT NURSE NOTE - OBJECTIVE STATEMENT
Pt is a 59 yo M who came to the Ed amb c/o epigastric pain. Pain was sudden onset lasting from 2a-4a thos morning and resolved spontaneously. No further episodes. Pt last ate at 8pm yesterday, denies heavy/greasy meal. A/O x3, denies chest pain/palpitations, no SOB, no fevers/chills, no abd pain/burning/belching, no n/v/d.

## 2018-05-09 NOTE — ED PROVIDER NOTE - OBJECTIVE STATEMENT
58 year old male w htn. hld, cad s/p stent  6 weeks ago performed by Dr Ramirez presents with complaint of 2 hour episode of epigastric pain which occurred at 2 am last night. The pain woke him up from sleep, did not change with position, nonpleuritic. His last meal prior to the pain was 8PM. When the pain resolved at 4 AM he fell asleep. He has not had the pain since then.

## 2018-05-10 DIAGNOSIS — R69 ILLNESS, UNSPECIFIED: ICD-10-CM

## 2018-09-14 PROBLEM — I25.10 ATHEROSCLEROTIC HEART DISEASE OF NATIVE CORONARY ARTERY WITHOUT ANGINA PECTORIS: Chronic | Status: ACTIVE | Noted: 2018-05-09

## 2018-09-14 PROBLEM — Z95.5 PRESENCE OF CORONARY ANGIOPLASTY IMPLANT AND GRAFT: Chronic | Status: ACTIVE | Noted: 2018-05-09

## 2018-10-03 ENCOUNTER — APPOINTMENT (OUTPATIENT)
Dept: CARDIOLOGY | Facility: CLINIC | Age: 58
End: 2018-10-03
Payer: MEDICAID

## 2018-10-03 VITALS
OXYGEN SATURATION: 98 % | DIASTOLIC BLOOD PRESSURE: 79 MMHG | BODY MASS INDEX: 29.99 KG/M2 | HEIGHT: 65 IN | HEART RATE: 63 BPM | WEIGHT: 180 LBS | SYSTOLIC BLOOD PRESSURE: 127 MMHG

## 2018-10-03 PROCEDURE — 99215 OFFICE O/P EST HI 40 MIN: CPT

## 2018-10-03 PROCEDURE — 93000 ELECTROCARDIOGRAM COMPLETE: CPT

## 2018-12-05 ENCOUNTER — TRANSCRIPTION ENCOUNTER (OUTPATIENT)
Age: 58
End: 2018-12-05

## 2018-12-05 ENCOUNTER — INPATIENT (INPATIENT)
Facility: HOSPITAL | Age: 58
LOS: 1 days | Discharge: HOME | DRG: 419 | End: 2018-12-07
Attending: SURGERY | Admitting: SURGERY
Payer: MEDICAID

## 2018-12-05 VITALS
DIASTOLIC BLOOD PRESSURE: 92 MMHG | OXYGEN SATURATION: 98 % | WEIGHT: 184.97 LBS | RESPIRATION RATE: 18 BRPM | SYSTOLIC BLOOD PRESSURE: 158 MMHG | HEIGHT: 65 IN | HEART RATE: 73 BPM

## 2018-12-05 DIAGNOSIS — E78.5 HYPERLIPIDEMIA, UNSPECIFIED: ICD-10-CM

## 2018-12-05 DIAGNOSIS — K81.0 ACUTE CHOLECYSTITIS: ICD-10-CM

## 2018-12-05 DIAGNOSIS — I25.10 ATHEROSCLEROTIC HEART DISEASE OF NATIVE CORONARY ARTERY WITHOUT ANGINA PECTORIS: ICD-10-CM

## 2018-12-05 LAB
ALBUMIN SERPL ELPH-MCNC: 4.6 G/DL — SIGNIFICANT CHANGE UP (ref 3.3–5)
ALP SERPL-CCNC: 63 U/L — SIGNIFICANT CHANGE UP (ref 40–120)
ALT FLD-CCNC: 38 U/L — SIGNIFICANT CHANGE UP (ref 10–45)
ANION GAP SERPL CALC-SCNC: 13 MMOL/L — SIGNIFICANT CHANGE UP (ref 5–17)
APTT BLD: 29.4 SEC — SIGNIFICANT CHANGE UP (ref 27.5–36.3)
AST SERPL-CCNC: 24 U/L — SIGNIFICANT CHANGE UP (ref 10–40)
BASE EXCESS BLDV CALC-SCNC: 4.3 MMOL/L — HIGH (ref -2–2)
BASOPHILS # BLD AUTO: 0 K/UL — SIGNIFICANT CHANGE UP (ref 0–0.2)
BASOPHILS NFR BLD AUTO: 0.4 % — SIGNIFICANT CHANGE UP (ref 0–2)
BILIRUB SERPL-MCNC: 0.3 MG/DL — SIGNIFICANT CHANGE UP (ref 0.2–1.2)
BUN SERPL-MCNC: 14 MG/DL — SIGNIFICANT CHANGE UP (ref 7–23)
CA-I SERPL-SCNC: 1.11 MMOL/L — LOW (ref 1.12–1.3)
CALCIUM SERPL-MCNC: 9.1 MG/DL — SIGNIFICANT CHANGE UP (ref 8.4–10.5)
CHLORIDE BLDV-SCNC: 109 MMOL/L — HIGH (ref 96–108)
CHLORIDE SERPL-SCNC: 101 MMOL/L — SIGNIFICANT CHANGE UP (ref 96–108)
CO2 BLDV-SCNC: 33 MMOL/L — HIGH (ref 22–30)
CO2 SERPL-SCNC: 25 MMOL/L — SIGNIFICANT CHANGE UP (ref 22–31)
CREAT SERPL-MCNC: 1.04 MG/DL — SIGNIFICANT CHANGE UP (ref 0.5–1.3)
EOSINOPHIL # BLD AUTO: 0.1 K/UL — SIGNIFICANT CHANGE UP (ref 0–0.5)
EOSINOPHIL NFR BLD AUTO: 1.9 % — SIGNIFICANT CHANGE UP (ref 0–6)
GAS PNL BLDV: 133 MMOL/L — LOW (ref 136–145)
GAS PNL BLDV: SIGNIFICANT CHANGE UP
GAS PNL BLDV: SIGNIFICANT CHANGE UP
GLUCOSE BLDV-MCNC: 133 MG/DL — HIGH (ref 70–99)
GLUCOSE SERPL-MCNC: 138 MG/DL — HIGH (ref 70–99)
HCO3 BLDV-SCNC: 31 MMOL/L — HIGH (ref 21–29)
HCT VFR BLD CALC: 42.3 % — SIGNIFICANT CHANGE UP (ref 39–50)
HCT VFR BLDA CALC: 44 % — SIGNIFICANT CHANGE UP (ref 39–50)
HGB BLD CALC-MCNC: 14.4 G/DL — SIGNIFICANT CHANGE UP (ref 13–17)
HGB BLD-MCNC: 14.5 G/DL — SIGNIFICANT CHANGE UP (ref 13–17)
INR BLD: 1.08 RATIO — SIGNIFICANT CHANGE UP (ref 0.88–1.16)
LACTATE BLDV-MCNC: 2 MMOL/L — SIGNIFICANT CHANGE UP (ref 0.7–2)
LIDOCAIN IGE QN: 38 U/L — SIGNIFICANT CHANGE UP (ref 7–60)
LYMPHOCYTES # BLD AUTO: 1.3 K/UL — SIGNIFICANT CHANGE UP (ref 1–3.3)
LYMPHOCYTES # BLD AUTO: 21.9 % — SIGNIFICANT CHANGE UP (ref 13–44)
MCHC RBC-ENTMCNC: 28 PG — SIGNIFICANT CHANGE UP (ref 27–34)
MCHC RBC-ENTMCNC: 34.1 GM/DL — SIGNIFICANT CHANGE UP (ref 32–36)
MCV RBC AUTO: 82.1 FL — SIGNIFICANT CHANGE UP (ref 80–100)
MONOCYTES # BLD AUTO: 0.3 K/UL — SIGNIFICANT CHANGE UP (ref 0–0.9)
MONOCYTES NFR BLD AUTO: 5.3 % — SIGNIFICANT CHANGE UP (ref 2–14)
NEUTROPHILS # BLD AUTO: 4.1 K/UL — SIGNIFICANT CHANGE UP (ref 1.8–7.4)
NEUTROPHILS NFR BLD AUTO: 70.5 % — SIGNIFICANT CHANGE UP (ref 43–77)
PCO2 BLDV: 57 MMHG — HIGH (ref 35–50)
PH BLDV: 7.35 — SIGNIFICANT CHANGE UP (ref 7.35–7.45)
PLATELET # BLD AUTO: 242 K/UL — SIGNIFICANT CHANGE UP (ref 150–400)
PO2 BLDV: 30 MMHG — SIGNIFICANT CHANGE UP (ref 25–45)
POTASSIUM BLDV-SCNC: 4.5 MMOL/L — SIGNIFICANT CHANGE UP (ref 3.5–5.3)
POTASSIUM SERPL-MCNC: 4.8 MMOL/L — SIGNIFICANT CHANGE UP (ref 3.5–5.3)
POTASSIUM SERPL-SCNC: 4.8 MMOL/L — SIGNIFICANT CHANGE UP (ref 3.5–5.3)
PROT SERPL-MCNC: 7.4 G/DL — SIGNIFICANT CHANGE UP (ref 6–8.3)
PROTHROM AB SERPL-ACNC: 12.4 SEC — SIGNIFICANT CHANGE UP (ref 10–12.9)
RBC # BLD: 5.16 M/UL — SIGNIFICANT CHANGE UP (ref 4.2–5.8)
RBC # FLD: 12.9 % — SIGNIFICANT CHANGE UP (ref 10.3–14.5)
SAO2 % BLDV: 44 % — LOW (ref 67–88)
SODIUM SERPL-SCNC: 139 MMOL/L — SIGNIFICANT CHANGE UP (ref 135–145)
WBC # BLD: 5.8 K/UL — SIGNIFICANT CHANGE UP (ref 3.8–10.5)
WBC # FLD AUTO: 5.8 K/UL — SIGNIFICANT CHANGE UP (ref 3.8–10.5)

## 2018-12-05 PROCEDURE — 99285 EMERGENCY DEPT VISIT HI MDM: CPT | Mod: 25

## 2018-12-05 PROCEDURE — 93010 ELECTROCARDIOGRAM REPORT: CPT

## 2018-12-05 PROCEDURE — 71045 X-RAY EXAM CHEST 1 VIEW: CPT | Mod: 26

## 2018-12-05 PROCEDURE — 78226 HEPATOBILIARY SYSTEM IMAGING: CPT | Mod: 26

## 2018-12-05 PROCEDURE — 76705 ECHO EXAM OF ABDOMEN: CPT | Mod: 26

## 2018-12-05 RX ORDER — ACETAMINOPHEN 500 MG
1000 TABLET ORAL ONCE
Qty: 0 | Refills: 0 | Status: COMPLETED | OUTPATIENT
Start: 2018-12-05 | End: 2018-12-06

## 2018-12-05 RX ORDER — ONDANSETRON 8 MG/1
4 TABLET, FILM COATED ORAL EVERY 6 HOURS
Qty: 0 | Refills: 0 | Status: DISCONTINUED | OUTPATIENT
Start: 2018-12-05 | End: 2018-12-06

## 2018-12-05 RX ORDER — PIPERACILLIN AND TAZOBACTAM 4; .5 G/20ML; G/20ML
3.38 INJECTION, POWDER, LYOPHILIZED, FOR SOLUTION INTRAVENOUS EVERY 8 HOURS
Qty: 0 | Refills: 0 | Status: DISCONTINUED | OUTPATIENT
Start: 2018-12-05 | End: 2018-12-06

## 2018-12-05 RX ORDER — ONDANSETRON 8 MG/1
4 TABLET, FILM COATED ORAL ONCE
Qty: 0 | Refills: 0 | Status: COMPLETED | OUTPATIENT
Start: 2018-12-05 | End: 2018-12-05

## 2018-12-05 RX ORDER — MORPHINE SULFATE 50 MG/1
2 CAPSULE, EXTENDED RELEASE ORAL ONCE
Qty: 0 | Refills: 0 | Status: DISCONTINUED | OUTPATIENT
Start: 2018-12-05 | End: 2018-12-05

## 2018-12-05 RX ORDER — LISINOPRIL 2.5 MG/1
2.5 TABLET ORAL DAILY
Qty: 0 | Refills: 0 | Status: DISCONTINUED | OUTPATIENT
Start: 2018-12-05 | End: 2018-12-06

## 2018-12-05 RX ORDER — ATORVASTATIN CALCIUM 80 MG/1
80 TABLET, FILM COATED ORAL AT BEDTIME
Qty: 0 | Refills: 0 | Status: DISCONTINUED | OUTPATIENT
Start: 2018-12-05 | End: 2018-12-06

## 2018-12-05 RX ORDER — ACETAMINOPHEN 500 MG
1000 TABLET ORAL ONCE
Qty: 0 | Refills: 0 | Status: COMPLETED | OUTPATIENT
Start: 2018-12-05 | End: 2018-12-05

## 2018-12-05 RX ORDER — METOPROLOL TARTRATE 50 MG
25 TABLET ORAL DAILY
Qty: 0 | Refills: 0 | Status: DISCONTINUED | OUTPATIENT
Start: 2018-12-05 | End: 2018-12-06

## 2018-12-05 RX ORDER — PIPERACILLIN AND TAZOBACTAM 4; .5 G/20ML; G/20ML
3.38 INJECTION, POWDER, LYOPHILIZED, FOR SOLUTION INTRAVENOUS ONCE
Qty: 0 | Refills: 0 | Status: COMPLETED | OUTPATIENT
Start: 2018-12-05 | End: 2018-12-05

## 2018-12-05 RX ORDER — ENOXAPARIN SODIUM 100 MG/ML
40 INJECTION SUBCUTANEOUS DAILY
Qty: 0 | Refills: 0 | Status: DISCONTINUED | OUTPATIENT
Start: 2018-12-05 | End: 2018-12-06

## 2018-12-05 RX ORDER — SODIUM CHLORIDE 9 MG/ML
1000 INJECTION, SOLUTION INTRAVENOUS
Qty: 0 | Refills: 0 | Status: DISCONTINUED | OUTPATIENT
Start: 2018-12-05 | End: 2018-12-06

## 2018-12-05 RX ADMIN — SODIUM CHLORIDE 125 MILLILITER(S): 9 INJECTION, SOLUTION INTRAVENOUS at 18:22

## 2018-12-05 RX ADMIN — MORPHINE SULFATE 2 MILLIGRAM(S): 50 CAPSULE, EXTENDED RELEASE ORAL at 07:00

## 2018-12-05 RX ADMIN — PIPERACILLIN AND TAZOBACTAM 200 GRAM(S): 4; .5 INJECTION, POWDER, LYOPHILIZED, FOR SOLUTION INTRAVENOUS at 12:21

## 2018-12-05 RX ADMIN — MORPHINE SULFATE 2 MILLIGRAM(S): 50 CAPSULE, EXTENDED RELEASE ORAL at 05:21

## 2018-12-05 RX ADMIN — PIPERACILLIN AND TAZOBACTAM 3.38 GRAM(S): 4; .5 INJECTION, POWDER, LYOPHILIZED, FOR SOLUTION INTRAVENOUS at 13:46

## 2018-12-05 RX ADMIN — SODIUM CHLORIDE 125 MILLILITER(S): 9 INJECTION, SOLUTION INTRAVENOUS at 13:47

## 2018-12-05 RX ADMIN — Medication 400 MILLIGRAM(S): at 05:21

## 2018-12-05 RX ADMIN — PIPERACILLIN AND TAZOBACTAM 25 GRAM(S): 4; .5 INJECTION, POWDER, LYOPHILIZED, FOR SOLUTION INTRAVENOUS at 21:28

## 2018-12-05 RX ADMIN — Medication 1000 MILLIGRAM(S): at 12:14

## 2018-12-05 RX ADMIN — SODIUM CHLORIDE 125 MILLILITER(S): 9 INJECTION, SOLUTION INTRAVENOUS at 12:15

## 2018-12-05 RX ADMIN — ATORVASTATIN CALCIUM 80 MILLIGRAM(S): 80 TABLET, FILM COATED ORAL at 21:29

## 2018-12-05 RX ADMIN — Medication 1000 MILLIGRAM(S): at 07:00

## 2018-12-05 RX ADMIN — SODIUM CHLORIDE 125 MILLILITER(S): 9 INJECTION, SOLUTION INTRAVENOUS at 16:28

## 2018-12-05 RX ADMIN — ONDANSETRON 4 MILLIGRAM(S): 8 TABLET, FILM COATED ORAL at 05:21

## 2018-12-05 NOTE — ED PROVIDER NOTE - MEDICAL DECISION MAKING DETAILS
Dr. Tarango Note: ab pain r/o nathan Dr. Tarango Note: ab pain r/o nathan  SCOOTER Christian MD : ruq pain known hx of stones, no fever or wbc count, us somewhat equivocal for cholecystisis however pt w ongoing ruq pain worsening, surgery consulted, will do hida scan. pt hd stable, endorsed to Dr Tarango at 0730 pending surg consultation.

## 2018-12-05 NOTE — ED PROVIDER NOTE - PHYSICAL EXAMINATION
SCOOTER Christian MD   Physical Exam:   constitutional - well appearing, awake and alert, oriented x3  head - no external evidence of trauma  cvs - rrr, no murmurs, no peripheral edema  resp - breath sounds clear and equal bilat  gi - abdomen soft w mod ruq tenderness, no rigidity, guarding or rebound, bowel sounds present  msk - moving all extremities spontaneously  neuro - alert and oriented x3, no focal deficits, CNs 2-12 grossly intact  skin- no jaundice, warm and dry  psych - mood and affect wnl, no apparent risk to self or others

## 2018-12-05 NOTE — ED ADULT NURSE NOTE - OBJECTIVE STATEMENT
58y male with hx of MI, HTN, HLD, and gallstones presents to the ER c/o epigastric pain since midnight. pt is alert and oriented x 4 and speaking coherently. Pt states that the pain woke him up out of sleep. pt states the pain is epigastric and underneath the breast bone. pt appears uncomfortable and is pacing in the room. pt vss. pt took Tylenol at midnight with out relief of pain; pt denies cp, sob, n/d, fevers. pt states he vomited two times tonight. MD edwards completed. will reassess.

## 2018-12-05 NOTE — ED PROVIDER NOTE - OBJECTIVE STATEMENT
SCOOTER Christian MD : 58 yom pmhx cad p/w onset ruq pain at midnight last night.  has been to the ED in past for similar pain and was told he had gallstones.  has had mild intermittent pain over last few months, has not sought care.  last night ate dinner (soup, chicken, rice) and had onset pain severe ruq few hrs later, assoc w few episodes of vomiting. no f/c. no cp or sob. works as an [employer]

## 2018-12-05 NOTE — CONSULT NOTE ADULT - PROBLEM SELECTOR RECOMMENDATION 9
Awaiting Lap Loretta. May proceed with acceptable cardiac risk with planned surgery. METS > 4 and no anginal symptoms.   IV Abx

## 2018-12-05 NOTE — ED ADULT NURSE REASSESSMENT NOTE - NS ED NURSE REASSESS COMMENT FT1
Report received from Estefany WILLIS, VS repeated. Patient resting comfortably, denies chest pain/SOB/pain. Pt instructed not to consume food or liquids due to medical condition and impending surgery, verbalized understanding. Pt instructed that he is admitted awaiting bed placement, verbalized understanding.

## 2018-12-05 NOTE — ED ADULT NURSE REASSESSMENT NOTE - NS ED NURSE REASSESS COMMENT FT1
[id number] Pt admitted and meds given as ordered no pain at this time Pending surgery to evaluate Pt Geovanna

## 2018-12-05 NOTE — ED PROVIDER NOTE - NS ED ROS FT
SCOOTER Christian MD   ROS:   constitutional - no fever, no chills  eyes - no visual changes, no redness  eent - no sore throat, no nasal congestion  cvs - no chest pain, no leg swelling  resp - no shortness of breath, no cough  gi - + abdominal pain, + vomiting, no diarrhea  gu - no dysuria, no hematuria  msk - no acute back pain, no joint swelling  skin - no rashes, no jaundice  neuro - no headache, no focal weakness  psych - no acute mental health issue

## 2018-12-05 NOTE — H&P ADULT - NSHPLABSRESULTS_GEN_ALL_CORE
Vital Signs Last 24 Hrs  T(C): 36.8 (05 Dec 2018 11:19), Max: 36.9 (05 Dec 2018 05:14)  T(F): 98.3 (05 Dec 2018 11:19), Max: 98.5 (05 Dec 2018 05:14)  HR: 73 (05 Dec 2018 11:19) (73 - 74)  BP: 117/84 (05 Dec 2018 11:19) (117/84 - 158/92)  BP(mean): --  RR: 16 (05 Dec 2018 11:19) (16 - 18)  SpO2: 99% (05 Dec 2018 11:19) (98% - 99%)          LABS:                        14.5   5.8   )-----------( 242      ( 05 Dec 2018 05:16 )             42.3     12-05    139  |  101  |  14  ----------------------------<  138<H>  4.8   |  25  |  1.04    Ca    9.1      05 Dec 2018 05:16    TPro  7.4  /  Alb  4.6  /  TBili  0.3  /  DBili  x   /  AST  24  /  ALT  38  /  AlkPhos  63  12-05    PT/INR - ( 05 Dec 2018 05:16 )   PT: 12.4 sec;   INR: 1.08 ratio         PTT - ( 05 Dec 2018 05:16 )  PTT:29.4 sec      INs and OUTs:      < from: NM Hepatobiliary Scan w/wo Gall Bladder (12.05.18 @ 10:22) >        < end of copied text >
Full range of motion with no contractures/No cyanosis/No immobilization/No tenderness/No casts/No clubbing/No erythema/No splints/No edema

## 2018-12-05 NOTE — ED ADULT NURSE REASSESSMENT NOTE - NS ED NURSE REASSESS COMMENT FT1
1836 pt  is on the OR schedule for tomorrow pt provided food at this time and pending Dr Gonzalez or some one form the service to come ans speak to  pt Pt provided food tray as per OR vidal  1845  juan j to see pt and pt to be NPO from now on and explained why to the pt by RN and MD nuñez

## 2018-12-05 NOTE — H&P ADULT - NSHPPHYSICALEXAM_GEN_ALL_CORE
PHYSICAL EXAM:  GENERAL: NAD, well-developed  HEAD:  Atraumatic, Normocephalic  EYES: EOMI, PERRLA, conjunctiva and sclera clear  NECK: Supple, No JVD  CHEST/LUNG: Clear to auscultation bilaterally; No wheeze  HEART: Regular rate and rhythm; No murmurs, rubs, or gallops  ABDOMEN: Soft, RUQ tenderness, Nondistended; Bowel sounds present  EXTREMITIES:  2+ Peripheral Pulses, No clubbing, cyanosis, or edema  PSYCH: AAOx3  NEUROLOGY: non-focal  SKIN: No rashes or lesions

## 2018-12-05 NOTE — CONSULT NOTE ADULT - SUBJECTIVE AND OBJECTIVE BOX
CHIEF COMPLAINT:  Abdominal pain      HISTORY OF PRESENT ILLNESS:  59 yo M with known CAD s/p PTCA/PCI (RCA and PDA)  in 2018 on ASA/Plavix, Htn, Hld presents with 1 day of RUQ abdominal pain. The pain began in the middle of the night and has been severe. He has had similar episodes in the past year but they've been intermittent and less severe. This episode has been associated with nausea and NBNB emesis. No fevers.  He's been diagnosed with gallstones in the past but has never spoke with a surgeon about removing his gallbladder.	       PAST MEDICAL & SURGICAL HISTORY:  Stented coronary artery  CAD (coronary artery disease)  Hyperlipidemia, unspecified hyperlipidemia type  No significant past surgical history          MEDICATIONS:  enoxaparin Injectable 40 milliGRAM(s) SubCutaneous daily  lisinopril 2.5 milliGRAM(s) Oral daily  metoprolol succinate ER 25 milliGRAM(s) Oral daily        acetaminophen    Suspension .. 1000 milliGRAM(s) Oral once PRN  ondansetron Injectable 4 milliGRAM(s) IV Push every 6 hours PRN      atorvastatin 80 milliGRAM(s) Oral at bedtime    lactated ringers. 1000 milliLiter(s) IV Continuous <Continuous>      FAMILY HISTORY:  Family history of coronary artery disease (Sibling)      SOCIAL HISTORY:    [ ] Non-smoker  [ ] Smoker  [ ] Alcohol    Allergies    No Known Allergies    Intolerances    	    REVIEW OF SYSTEMS:  CONSTITUTIONAL: No fever, weight loss, + fatigue  EYES: No eye pain, visual disturbances, or discharge  ENMT:  No difficulty hearing, tinnitus, vertigo; No sinus or throat pain  NECK: No pain or stiffness  RESPIRATORY: No cough, wheezing, chills or hemoptysis; No Shortness of Breath  CARDIOVASCULAR: No chest pain, palpitations, passing out, dizziness, or leg swelling  GASTROINTESTINAL: + abdominal or epigastric pain. No nausea, vomiting, or hematemesis; No diarrhea or constipation. No melena or hematochezia.  GENITOURINARY: No dysuria, frequency, hematuria, or incontinence  NEUROLOGICAL: No headaches, memory loss, loss of strength, numbness, or tremors  SKIN: No itching, burning, rashes, or lesions   LYMPH Nodes: No enlarged glands  ENDOCRINE: No heat or cold intolerance; No hair loss  MUSCULOSKELETAL: No joint pain or swelling; No muscle, back, or extremity pain  PSYCHIATRIC: No depression, anxiety, mood swings, or difficulty sleeping  HEME/LYMPH: No easy bruising, or bleeding gums  ALLERY AND IMMUNOLOGIC: No hives or eczema	    [ ] All others negative	  [ ] Unable to obtain    PHYSICAL EXAM:  T(C): 36.8 (18 @ 11:19), Max: 36.9 (18 @ 05:14)  HR: 73 (18 @ 11:19) (73 - 74)  BP: 117/84 (18 @ 11:19) (117/84 - 158/92)  RR: 16 (18 @ 11:19) (16 - 18)  SpO2: 99% (18 @ 11:19) (98% - 99%)  Wt(kg): --  I&O's Summary      Appearance: Normal	  HEENT:   Normal oral mucosa, PERRL, EOMI	  Lymphatic: No lymphadenopathy  Cardiovascular: Normal S1 S2, No JVD, No murmurs, No edema  Respiratory: Lungs clear to auscultation	  Psychiatry: A & O x 3, Mood & affect appropriate  Gastrointestinal:  Soft, +tender, + BS	  Skin: No rashes, No ecchymoses, No cyanosis	  Neurologic: Non-focal  Extremities: Normal range of motion, No clubbing, cyanosis or edema  Vascular: Peripheral pulses palpable 2+ bilaterally    TELEMETRY: 	    ECG:  	NSR, inferio infarct, no acute ischemic stt changes     < from: Cardiac Cath Lab - Adult (18 @ 11:30) >    [facility]  Department of Cardiology  [address] [place] [address]  [phone]  Cath Lab Report -- Comprehensive Report  Patient: JADE CHEUNG  Study date: 2018  Account number: [id number]  MR number: [id number]  : 1960  Gender: Male  Race: Unknown  Case Physician(s):  KEHINDE Lester M.D.  Fellow:  Yeison Cardona D.O.  Referring Physician:  KEHINDE Abdi M.D.  INDICATIONS: Initial STEMI.  HISTORY: There was noprior cardiac history. The patient has hypertension  and medication-treated dyslipidemia.  PROCEDURE:  --  Left heart catheterization with ventriculography.  --  Left coronary angiography.  --  Right coronary angiography.  --  Coronary Thrombectomy.  --  Intervention on mid RCA: drug-eluting stent.  --  Intervention on right PDA: balloon angioplasty.  --  Intervention on proximal RCA: drug-eluting stent.  TECHNIQUE: The risks and alternatives of the procedures and conscious  sedation were explained to the patient and informed consent was obtained.  Cardiac catheterization performed urgently. Coronary intervention  performed emergently.  Local anesthetic given. Right radial artery access. Left heart  catheterization. Ventriculography was performed. Left coronary artery  angiography. The vessel was injected utilizing a catheter. Right coronary  artery angiography. The vessel was injected utilizing a catheter.  RADIATION EXPOSURE: 15.9 min. A successful drug-eluting stent was  performed on the 100 % lesion in the mid RCA. Following intervention there  was a 1 % residual stenosis. There was HERACLIO 0 flow before the procedure  and HERACLIO 3 flow after the procedure. Vessel setup was performed. A 6FR JR4  LAUNCHER guiding catheter was used to intubate the vessel. Vessel setup  was performed. A BMW UNIVERSAL 190CM wire was used to cross the lesion.  Balloon angioplasty was performed, using a 2.00 X 20 APEX balloon, with 1  inflations and a maximum inflation pressure of 18 myron. A 3.00 X 32 SYNERGY  drug-eluting stent was placed across the lesion and deployed at a maximum  inflation pressure of 18 myron. A successful balloon angioplasty was  performed on the 80 % lesion in the right posterior descending artery.  Following intervention there was a 1 % residual stenosis. According to the  ACC/AHA classification system, this lesion was a type C lesion. There was  HERACLIO 3 flow before the procedure and HERACLIO 3 flow after the procedure.  Vessel setup was performed. A BMW UNIVERSAL 190CM wire was used to cross  the lesion. Vessel setup was performed. A 6FR JR4 LAUNCHER guiding  catheter was used to intubate the vessel. Balloon angioplasty was  performed, using a 2.00 X 20 APEX balloon, with 1 inflations and a maximum  inflation pressure of 16 myron. A successful drug-eluting stent was  performed on the 80 % lesion in the proximal RCA. Following intervention  there was a 1 % residual stenosis. According to the ACC/AHA classification  system, this lesion was a type C lesion. There was HERACLIO 3 flow before the  procedure and HERACLIO 3 flow after the procedure. Vessel setup was performed.  A 6FR JR4 LAUNCHER guiding catheter was used to intubate the vessel.  Vessel setup was performed. A BMW UNIVERSAL 190CM wire was used to cross  the lesion. A 3.00 X 38 SYNERGY drug-eluting stent was placed across the  lesion and deployed at a maximum inflation pressure of 20 myron. Vessel  setup was performed. A BMW UNIVERSAL 190CM wire was used to cross the  lesion. Vessel setup was performed. A 6FR JR4 LAUNCHER guiding catheter  was used to intubate the vessel. A 3.50 X 12 SYNERGY drug-eluting stent  was placed across the lesion and deployed at a maximum inflation pressure  of 22 myron. Balloon angioplasty was performed, with 2 inflations and a  maximum inflation pressure of 22 myron. Coronary Thrombectomy.  CONTRAST GIVEN: Omnipaque 185 ml.  MEDICATIONS GIVEN: Verapamil (Isoptin, Calan, Covera), 2.5 mg, IA.  Atropine, 0.5 mg, IV. Nicardipine (Cardene), 250 mcg, intracoronary.  Nitroglycerin, 100 mcg, intracoronary. Heparin, 4000 units, IV. Normal  Saline .9%, 250, IV, other. Neosynephrine (Phenylephrine), 1 mcg, IV.  Integrilin (2 mg/mL), 6.8 ml, IV. Integrilin (2 mg/mL), 6.8 ml, IV.  VENTRICLES: EF estimated was 40 %.  CORONARY VESSELS: The coronary circulation is right dominant.  LM:   --  LM: Angiography showed minor luminal irregularities with no flow  limiting lesions.  LAD:   --  Mid LAD: There was a 25 % stenosis.  --  D1: There was a 50 % stenosis.  CX:   --  OM1: There was a 100 % stenosis.  RCA:   --  Proximal RCA: There was a 80 % stenosis.  --  Mid RCA: There was a 100 % stenosis.  --  RPDA: There was a 80 % stenosis.  COMPLICATIONS: There were no complications.  DIAGNOSTIC RECOMMENDATIONS: ASA and Plavix for 1 year.  INTERVENTIONAL RECOMMENDATIONS: ASA and Plavix for 1 year.  Prepared and signed by  Roman Ramirez M.D.  Signed 2018 13:28:39  HEMODYNAMIC TABLES  Pressures:  Baseline  Pressures:  - HR: 91  Pressures:  - Rhythm:  Pressures:  -- Aortic Pressure (S/D/M): 140/92/111  Pressures:  -- Left Ventricle (s/edp): 129/36/--  Pressures:  Intervention  Pressures:  - HR: 87  Pressures:  - Rhythm:  Pressures:  -- Aortic Pressure (S/D/M): 85/61/72  Outputs:  Baseline  Outputs:  -- CALCULATIONS[age]  Outputs:  -- CALCULATIONS: Body Surface Area: 1.73  Outputs:  -- CALCULATIONS: Height in cm: 157.00  Outputs:  -- CALCULATIONS: Sex: Male  Outputs:  -- CALCULATIONS: Weight in k.60    < end of copied text >        RADIOLOGY:      < from: NM Hepatobiliary Scan w/wo Gall Bladder (18 @ 10:22) >    EXAM:  NM HEPATOBILIARY IMG                                PROCEDURE DATE:  2018          INTERPRETATION:  RADIOPHARMACEUTICAL: 3.1 and 3.3 mCi Tc-99m-mebrofenin,   I.V.; 2 doses    CLINICAL INFORMATION: 58 year old male with cholelithiasis, abdominal   pain, and borderline gallbladder wall thickening on ultrasound; referred   to evaluate for acute cholecystitis.    TECHNIQUE:  Dynamic images of the anterior abdomen were obtained for 2   hours following radiopharmaceutical injection followed by static images   of the abdomen in the anterior, right anterior oblique and right lateral   projections. Morphine 4 mg I.V. and a second dose of radiopharmaceutical   were administered at approximately 80 minutes.    COMPARISON: No previous hepatobiliary scans were available for   comparison.     FINDINGS: There is prompt, homogeneous uptake of radiopharmaceutical by   the hepatocytes. Activity is first seen in the bowel at about 20 minutes.   The gallbladder is not visualized at any time during the study, despite   morphine administration. There is good clearance of activity from the   liver at the end of the study.    IMPRESSION: Abnormal morphine-augmented hepatobiliary scan: acute   cholecystitis.    Dr. NATHALIA Tarango was informed of these results by Dr. SARAH Hurd via   telephone with read back at about 11:45 AM on 2018                    ARY HURD M.D., CHIEF OF NUCLEAR MEDICINE  This document has been electronically signed. Dec  5 2018 11:43AM                < end of copied text >      < from: US Gallbladder (18 @ 06:40) >    EXAM:  US GALLBLADDER                            PROCEDURE DATE:  2018            INTERPRETATION:  Clinical indication: Epigastric pain. Known gallstones.    Technique: Gallbladder ultrasound was performed.    Comparison: 2018.    Findings:     LIVER: 60.3 cm in length. Mildly increased echogenicity, suggestive of   fatty infiltration.  GALLBLADDER: Gallstone and sludge. Borderline gallbladder wall (0.3 cm).   No pericholecystic fluid. Negative sonographic Torres sign. However, the   patient was premedicated.  BILIARY: No intrahepatic or extrahepatic biliary dilatation. Visualized   common bile duct measuring up to 0.3 cm.   PANCREAS: Poorly visualized due to bowel gas.  RIGHT KIDNEY: 11.6 cm in length. No hydronephrosis or obvious renal   stone. A 1.6 x 1.6 x 1.4 cm cyst in the upper pole.  ADDITIONAL: No ascites.     Impression:    Sonographic findings equivocal for acute cholecystitis. If there is   clinical suspicion for acute cholecystitis, a hepatobiliary scan may be   obtained for further evaluation.    Additional findings as described.                    SHIRA SEXTON M.D., ATTENDING RADIOLOGIST  This document has been electronically signed. Dec  5 2018  6:52AM                < end of copied text >    OTHER: 	  	  LABS:	 	    CARDIAC MARKERS:                                  14.5   5.8   )-----------( 242      ( 05 Dec 2018 05:16 )             42.3     12-    139  |  101  |  14  ----------------------------<  138<H>  4.8   |  25  |  1.04    Ca    9.1      05 Dec 2018 05:16    TPro  7.4  /  Alb  4.6  /  TBili  0.3  /  DBili  x   /  AST  24  /  ALT  38  /  AlkPhos  63  12-05    proBNP:   Lipid Profile:   HgA1c:   TSH: CHIEF COMPLAINT:  Abdominal pain      HISTORY OF PRESENT ILLNESS:  57 yo M with known CAD s/p PTCA/PCI (RCA and PDA)  in 2018 on ASA/Plavix, Htn, Hld presents with 1 day of RUQ abdominal pain. The pain began in the middle of the night and has been severe. He has had similar episodes in the past year but they've been intermittent and less severe. This episode has been associated with nausea and NBNB emesis. No fevers.  He's been diagnosed with gallstones in the past but has never spoke with a surgeon about removing his gallbladder.	  Patient is deaf/mute. Information obtained via  at bedside. Patient wife was at bedside as well.    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 He denies chest pain, shortness of breath, or palpitations.           PAST MEDICAL & SURGICAL HISTORY:  Stented coronary artery  CAD (coronary artery disease)  Hyperlipidemia, unspecified hyperlipidemia type  No significant past surgical history          MEDICATIONS:  enoxaparin Injectable 40 milliGRAM(s) SubCutaneous daily  lisinopril 2.5 milliGRAM(s) Oral daily  metoprolol succinate ER 25 milliGRAM(s) Oral daily        acetaminophen    Suspension .. 1000 milliGRAM(s) Oral once PRN  ondansetron Injectable 4 milliGRAM(s) IV Push every 6 hours PRN      atorvastatin 80 milliGRAM(s) Oral at bedtime    lactated ringers. 1000 milliLiter(s) IV Continuous <Continuous>      FAMILY HISTORY:  Family history of coronary artery disease (Sibling)      SOCIAL HISTORY:    [ ] Non-smoker  [ ] Smoker  [ ] Alcohol    Allergies    No Known Allergies    Intolerances    	    REVIEW OF SYSTEMS:  CONSTITUTIONAL: No fever, weight loss, + fatigue  EYES: No eye pain, visual disturbances, or discharge  ENMT:  No difficulty hearing, tinnitus, vertigo; No sinus or throat pain  NECK: No pain or stiffness  RESPIRATORY: No cough, wheezing, chills or hemoptysis; No Shortness of Breath  CARDIOVASCULAR: No chest pain, palpitations, passing out, dizziness, or leg swelling  GASTROINTESTINAL: + abdominal or epigastric pain. No nausea, vomiting, or hematemesis; No diarrhea or constipation. No melena or hematochezia.  GENITOURINARY: No dysuria, frequency, hematuria, or incontinence  NEUROLOGICAL: No headaches, memory loss, loss of strength, numbness, or tremors  SKIN: No itching, burning, rashes, or lesions   LYMPH Nodes: No enlarged glands  ENDOCRINE: No heat or cold intolerance; No hair loss  MUSCULOSKELETAL: No joint pain or swelling; No muscle, back, or extremity pain  PSYCHIATRIC: No depression, anxiety, mood swings, or difficulty sleeping  HEME/LYMPH: No easy bruising, or bleeding gums  ALLERY AND IMMUNOLOGIC: No hives or eczema	    [ ] All others negative	  [ ] Unable to obtain    PHYSICAL EXAM:  T(C): 36.8 (18 @ 11:19), Max: 36.9 (18 @ 05:14)  HR: 73 (18 @ 11:19) (73 - 74)  BP: 117/84 (18 @ 11:19) (117/84 - 158/92)  RR: 16 (18 @ 11:19) (16 - 18)  SpO2: 99% (18 @ 11:19) (98% - 99%)  Wt(kg): --  I&O's Summary      Appearance: Normal, deaf/mute 	  HEENT:   Normal oral mucosa, PERRL, EOMI	  Lymphatic: No lymphadenopathy  Cardiovascular: Normal S1 S2, No JVD, No murmurs, No edema  Respiratory: Lungs clear to auscultation	  Psychiatry: A & O x 3, Mood & affect appropriate  Gastrointestinal:  Soft, +tender, + BS	  Skin: No rashes, No ecchymoses, No cyanosis	  Neurologic: Non-focal  Extremities: Normal range of motion, No clubbing, cyanosis or edema  Vascular: Peripheral pulses palpable 2+ bilaterally    TELEMETRY: 	    ECG:  	NSR, inferio infarct, no acute ischemic stt changes     < from: Cardiac Cath Lab - Adult (18 @ 11:30) >    [facility]  Department of Cardiology  [address] [place] [address]  [phone]  Cath Lab Report -- Comprehensive Report  Patient: JADE CHEUNG  Study date: 2018  Account number: [id number]  MR number: [id number]  : 1960  Gender: Male  Race: Unknown  Case Physician(s):  KEHINDE Lester M.D.  Fellow:  Yeison Cardona D.O.  Referring Physician:  Amr KEHINDE Yeager M.D.  INDICATIONS: Initial STEMI.  HISTORY: There was noprior cardiac history. The patient has hypertension  and medication-treated dyslipidemia.  PROCEDURE:  --  Left heart catheterization with ventriculography.  --  Left coronary angiography.  --  Right coronary angiography.  --  Coronary Thrombectomy.  --  Intervention on mid RCA: drug-eluting stent.  --  Intervention on right PDA: balloon angioplasty.  --  Intervention on proximal RCA: drug-eluting stent.  TECHNIQUE: The risks and alternatives of the procedures and conscious  sedation were explained to the patient and informed consent was obtained.  Cardiac catheterization performed urgently. Coronary intervention  performed emergently.  Local anesthetic given. Right radial artery access. Left heart  catheterization. Ventriculography was performed. Left coronary artery  angiography. The vessel was injected utilizing a catheter. Right coronary  artery angiography. The vessel was injected utilizing a catheter.  RADIATION EXPOSURE: 15.9 min. A successful drug-eluting stent was  performed on the 100 % lesion in the mid RCA. Following intervention there  was a 1 % residual stenosis. There was HERACLIO 0 flow before the procedure  and HERACLIO 3 flow after the procedure. Vessel setup was performed. A 6FR JR4  LAUNCHER guiding catheter was used to intubate the vessel. Vessel setup  was performed. A BMW UNIVERSAL 190CM wire was used to cross the lesion.  Balloon angioplasty was performed, using a 2.00 X 20 APEX balloon, with 1  inflations and a maximum inflation pressure of 18 myron. A 3.00 X 32 SYNERGY  drug-eluting stent was placed across the lesion and deployed at a maximum  inflation pressure of 18 myron. A successful balloon angioplasty was  performed on the 80 % lesion in the right posterior descending artery.  Following intervention there was a 1 % residual stenosis. According to the  ACC/AHA classification system, this lesion was a type C lesion. There was  HERACLIO 3 flow before the procedure and HERACLIO 3 flow after the procedure.  Vessel setup was performed. A BMW UNIVERSAL 190CM wire was used to cross  the lesion. Vessel setup was performed. A 6FR JR4 LAUNCHER guiding  catheter was used to intubate the vessel. Balloon angioplasty was  performed, using a 2.00 X 20 APEX balloon, with 1 inflations and a maximum  inflation pressure of 16 myron. A successful drug-eluting stent was  performed on the 80 % lesion in the proximal RCA. Following intervention  there was a 1 % residual stenosis. According to the ACC/AHA classification  system, this lesion was a type C lesion. There was HERACLIO 3 flow before the  procedure and HERACLIO 3 flow after the procedure. Vessel setup was performed.  A 6FR JR4 LAUNCHER guiding catheter was used to intubate the vessel.  Vessel setup was performed. A BMW UNIVERSAL 190CM wire was used to cross  the lesion. A 3.00 X 38 SYNERGY drug-eluting stent was placed across the  lesion and deployed at a maximum inflation pressure of 20 myron. Vessel  setup was performed. A BMW UNIVERSAL 190CM wire was used to cross the  lesion. Vessel setup was performed. A 6FR JR4 LAUNCHER guiding catheter  was used to intubate the vessel. A 3.50 X 12 SYNERGY drug-eluting stent  was placed across the lesion and deployed at a maximum inflation pressure  of 22 myron. Balloon angioplasty was performed, with 2 inflations and a  maximum inflation pressure of 22 myron. Coronary Thrombectomy.  CONTRAST GIVEN: Omnipaque 185 ml.  MEDICATIONS GIVEN: Verapamil (Isoptin, Calan, Covera), 2.5 mg, IA.  Atropine, 0.5 mg, IV. Nicardipine (Cardene), 250 mcg, intracoronary.  Nitroglycerin, 100 mcg, intracoronary. Heparin, 4000 units, IV. Normal  Saline .9%, 250, IV, other. Neosynephrine (Phenylephrine), 1 mcg, IV.  Integrilin (2 mg/mL), 6.8 ml, IV. Integrilin (2 mg/mL), 6.8 ml, IV.  VENTRICLES: EF estimated was 40 %.  CORONARY VESSELS: The coronary circulation is right dominant.  LM:   --  LM: Angiography showed minor luminal irregularities with no flow  limiting lesions.  LAD:   --  Mid LAD: There was a 25 % stenosis.  --  D1: There was a 50 % stenosis.  CX:   --  OM1: There was a 100 % stenosis.  RCA:   --  Proximal RCA: There was a 80 % stenosis.  --  Mid RCA: There was a 100 % stenosis.  --  RPDA: There was a 80 % stenosis.  COMPLICATIONS: There were no complications.  DIAGNOSTIC RECOMMENDATIONS: ASA and Plavix for 1 year.  INTERVENTIONAL RECOMMENDATIONS: ASA and Plavix for 1 year.  Prepared and signed by  Roman Ramirez M.D.  Signed 2018 13:28:39  HEMODYNAMIC TABLES  Pressures:  Baseline  Pressures:  - HR: 91  Pressures:  - Rhythm:  Pressures:  -- Aortic Pressure (S/D/M): 140/92/111  Pressures:  -- Left Ventricle (s/edp): 129/36/--  Pressures:  Intervention  Pressures:  - HR: 87  Pressures:  - Rhythm:  Pressures:  -- Aortic Pressure (S/D/M): 85/61/72  Outputs:  Baseline  Outputs:  -- CALCULATIONS[age]  Outputs:  -- CALCULATIONS: Body Surface Area: 1.73  Outputs:  -- CALCULATIONS: Height in cm: 157.00  Outputs:  -- CALCULATIONS: Sex: Male  Outputs:  -- CALCULATIONS: Weight in k.60    < end of copied text >        RADIOLOGY:      < from: NM Hepatobiliary Scan w/wo Gall Bladder (18 @ 10:22) >    EXAM:  NM HEPATOBILIARY IMG                                PROCEDURE DATE:  2018          INTERPRETATION:  RADIOPHARMACEUTICAL: 3.1 and 3.3 mCi Tc-99m-mebrofenin,   I.V.; 2 doses    CLINICAL INFORMATION: 58 year old male with cholelithiasis, abdominal   pain, and borderline gallbladder wall thickening on ultrasound; referred   to evaluate for acute cholecystitis.    TECHNIQUE:  Dynamic images of the anterior abdomen were obtained for 2   hours following radiopharmaceutical injection followed by static images   of the abdomen in the anterior, right anterior oblique and right lateral   projections. Morphine 4 mg I.V. and a second dose of radiopharmaceutical   were administered at approximately 80 minutes.    COMPARISON: No previous hepatobiliary scans were available for   comparison.     FINDINGS: There is prompt, homogeneous uptake of radiopharmaceutical by   the hepatocytes. Activity is first seen in the bowel at about 20 minutes.   The gallbladder is not visualized at any time during the study, despite   morphine administration. There is good clearance of activity from the   liver at the end of the study.    IMPRESSION: Abnormal morphine-augmented hepatobiliary scan: acute   cholecystitis.    Dr. NATHALIA Tarango was informed of these results by Dr. SARAH Hurd via   telephone with read back at about 11:45 AM on 2018                    ARY HURD M.D., CHIEF OF NUCLEAR MEDICINE  This document has been electronically signed. Dec  5 2018 11:43AM                < end of copied text >      < from: US Gallbladder (18 @ 06:40) >    EXAM:  US GALLBLADDER                            PROCEDURE DATE:  2018            INTERPRETATION:  Clinical indication: Epigastric pain. Known gallstones.    Technique: Gallbladder ultrasound was performed.    Comparison: 2018.    Findings:     LIVER: 60.3 cm in length. Mildly increased echogenicity, suggestive of   fatty infiltration.  GALLBLADDER: Gallstone and sludge. Borderline gallbladder wall (0.3 cm).   No pericholecystic fluid. Negative sonographic Torres sign. However, the   patient was premedicated.  BILIARY: No intrahepatic or extrahepatic biliary dilatation. Visualized   common bile duct measuring up to 0.3 cm.   PANCREAS: Poorly visualized due to bowel gas.  RIGHT KIDNEY: 11.6 cm in length. No hydronephrosis or obvious renal   stone. A 1.6 x 1.6 x 1.4 cm cyst in the upper pole.  ADDITIONAL: No ascites.     Impression:    Sonographic findings equivocal for acute cholecystitis. If there is   clinical suspicion for acute cholecystitis, a hepatobiliary scan may be   obtained for further evaluation.    Additional findings as described.                    SHIRA SEXTON M.D., ATTENDING RADIOLOGIST  This document has been electronically signed. Dec  5 2018  6:52AM                < end of copied text >    OTHER: 	  	  LABS:	 	    CARDIAC MARKERS:                                  14.5   5.8   )-----------( 242      ( 05 Dec 2018 05:16 )             42.3     12-    139  |  101  |  14  ----------------------------<  138<H>  4.8   |  25  |  1.04    Ca    9.1      05 Dec 2018 05:16    TPro  7.4  /  Alb  4.6  /  TBili  0.3  /  DBili  x   /  AST  24  /  ALT  38  /  AlkPhos  63  12-05    proBNP:   Lipid Profile:   HgA1c:   TSH: CHIEF COMPLAINT:  Abdominal pain      HISTORY OF PRESENT ILLNESS:  57 yo M with known CAD s/p PTCA/PCI (RCA and PDA)  in 2018 on ASA/Plavix, Htn, Hld presents with 1 day of RUQ abdominal pain. The pain began in the middle of the night and has been severe. He has had similar episodes in the past year but they've been intermittent and less severe. This episode has been associated with nausea and NBNB emesis. No fevers.  He's been diagnosed with gallstones in the past but has never spoke with a surgeon about removing his gallbladder.	  Patient is deaf/mute. Information obtained via  at bedside. Patient wife was at bedside as well.    Denies chest pain, shortness of breath, orthopnea or pnd. He states to be active on a daily basis with no anginal symptoms.            PAST MEDICAL & SURGICAL HISTORY:  Stented coronary artery  CAD (coronary artery disease)  Hyperlipidemia, unspecified hyperlipidemia type  No significant past surgical history          MEDICATIONS:  enoxaparin Injectable 40 milliGRAM(s) SubCutaneous daily  lisinopril 2.5 milliGRAM(s) Oral daily  metoprolol succinate ER 25 milliGRAM(s) Oral daily        acetaminophen    Suspension .. 1000 milliGRAM(s) Oral once PRN  ondansetron Injectable 4 milliGRAM(s) IV Push every 6 hours PRN      atorvastatin 80 milliGRAM(s) Oral at bedtime    lactated ringers. 1000 milliLiter(s) IV Continuous <Continuous>      FAMILY HISTORY:  Family history of coronary artery disease (Sibling)      SOCIAL HISTORY:    [ ] Non-smoker  [ ] Smoker  [ ] Alcohol    Allergies    No Known Allergies    Intolerances    	    REVIEW OF SYSTEMS:  CONSTITUTIONAL: No fever, weight loss, + fatigue  EYES: No eye pain, visual disturbances, or discharge  ENMT:  No difficulty hearing, tinnitus, vertigo; No sinus or throat pain  NECK: No pain or stiffness  RESPIRATORY: No cough, wheezing, chills or hemoptysis; No Shortness of Breath  CARDIOVASCULAR: No chest pain, palpitations, passing out, dizziness, or leg swelling  GASTROINTESTINAL: + abdominal or epigastric pain. No nausea, vomiting, or hematemesis; No diarrhea or constipation. No melena or hematochezia.  GENITOURINARY: No dysuria, frequency, hematuria, or incontinence  NEUROLOGICAL: No headaches, memory loss, loss of strength, numbness, or tremors  SKIN: No itching, burning, rashes, or lesions   LYMPH Nodes: No enlarged glands  ENDOCRINE: No heat or cold intolerance; No hair loss  MUSCULOSKELETAL: No joint pain or swelling; No muscle, back, or extremity pain  PSYCHIATRIC: No depression, anxiety, mood swings, or difficulty sleeping  HEME/LYMPH: No easy bruising, or bleeding gums  ALLERY AND IMMUNOLOGIC: No hives or eczema	    [ ] All others negative	  [ ] Unable to obtain    PHYSICAL EXAM:  T(C): 36.8 (18 @ 11:19), Max: 36.9 (18 @ 05:14)  HR: 73 (18 @ 11:19) (73 - 74)  BP: 117/84 (18 @ 11:19) (117/84 - 158/92)  RR: 16 (18 @ 11:19) (16 - 18)  SpO2: 99% (18 @ 11:19) (98% - 99%)  Wt(kg): --  I&O's Summary      Appearance: Normal, deaf/mute 	  HEENT:   Normal oral mucosa, PERRL, EOMI	  Lymphatic: No lymphadenopathy  Cardiovascular: Normal S1 S2, No JVD, No murmurs, No edema  Respiratory: Lungs clear to auscultation	  Psychiatry: A & O x 3, Mood & affect appropriate  Gastrointestinal:  Soft, +tender, + BS	  Skin: No rashes, No ecchymoses, No cyanosis	  Neurologic: Non-focal  Extremities: Normal range of motion, No clubbing, cyanosis or edema  Vascular: Peripheral pulses palpable 2+ bilaterally    TELEMETRY: 	    ECG:  	NSR, inferio infarct, no acute ischemic stt changes     < from: Cardiac Cath Lab - Adult (18 @ 11:30) >    [facility]  Department of Cardiology  [address] [place] [address]  [phone]  Cath Lab Report -- Comprehensive Report  Patient: JADE CHEUNG  Study date: 2018  Account number: [id number]  MR number: [id number]  : 1960  Gender: Male  Race: Unknown  Case Physician(s):  KEHINDE Lester M.D.  Fellow:  Yeison Cardona D.O.  Referring Physician:  Amr KEHINDE Yeager M.D.  INDICATIONS: Initial STEMI.  HISTORY: There was noprior cardiac history. The patient has hypertension  and medication-treated dyslipidemia.  PROCEDURE:  --  Left heart catheterization with ventriculography.  --  Left coronary angiography.  --  Right coronary angiography.  --  Coronary Thrombectomy.  --  Intervention on mid RCA: drug-eluting stent.  --  Intervention on right PDA: balloon angioplasty.  --  Intervention on proximal RCA: drug-eluting stent.  TECHNIQUE: The risks and alternatives of the procedures and conscious  sedation were explained to the patient and informed consent was obtained.  Cardiac catheterization performed urgently. Coronary intervention  performed emergently.  Local anesthetic given. Right radial artery access. Left heart  catheterization. Ventriculography was performed. Left coronary artery  angiography. The vessel was injected utilizing a catheter. Right coronary  artery angiography. The vessel was injected utilizing a catheter.  RADIATION EXPOSURE: 15.9 min. A successful drug-eluting stent was  performed on the 100 % lesion in the mid RCA. Following intervention there  was a 1 % residual stenosis. There was HERACLIO 0 flow before the procedure  and HERACLIO 3 flow after the procedure. Vessel setup was performed. A 6FR JR4  LAUNCHER guiding catheter was used to intubate the vessel. Vessel setup  was performed. A BMW UNIVERSAL 190CM wire was used to cross the lesion.  Balloon angioplasty was performed, using a 2.00 X 20 APEX balloon, with 1  inflations and a maximum inflation pressure of 18 myron. A 3.00 X 32 SYNERGY  drug-eluting stent was placed across the lesion and deployed at a maximum  inflation pressure of 18 myron. A successful balloon angioplasty was  performed on the 80 % lesion in the right posterior descending artery.  Following intervention there was a 1 % residual stenosis. According to the  ACC/AHA classification system, this lesion was a type C lesion. There was  HERACLIO 3 flow before the procedure and HERACLIO 3 flow after the procedure.  Vessel setup was performed. A BMW UNIVERSAL 190CM wire was used to cross  the lesion. Vessel setup was performed. A 6FR JR4 LAUNCHER guiding  catheter was used to intubate the vessel. Balloon angioplasty was  performed, using a 2.00 X 20 APEX balloon, with 1 inflations and a maximum  inflation pressure of 16 myron. A successful drug-eluting stent was  performed on the 80 % lesion in the proximal RCA. Following intervention  there was a 1 % residual stenosis. According to the ACC/AHA classification  system, this lesion was a type C lesion. There was HERACLIO 3 flow before the  procedure and HERACLIO 3 flow after the procedure. Vessel setup was performed.  A 6FR JR4 LAUNCHER guiding catheter was used to intubate the vessel.  Vessel setup was performed. A BMW UNIVERSAL 190CM wire was used to cross  the lesion. A 3.00 X 38 SYNERGY drug-eluting stent was placed across the  lesion and deployed at a maximum inflation pressure of 20 myron. Vessel  setup was performed. A BMW UNIVERSAL 190CM wire was used to cross the  lesion. Vessel setup was performed. A 6FR JR4 LAUNCHER guiding catheter  was used to intubate the vessel. A 3.50 X 12 SYNERGY drug-eluting stent  was placed across the lesion and deployed at a maximum inflation pressure  of 22 myron. Balloon angioplasty was performed, with 2 inflations and a  maximum inflation pressure of 22 myrno. Coronary Thrombectomy.  CONTRAST GIVEN: Omnipaque 185 ml.  MEDICATIONS GIVEN: Verapamil (Isoptin, Calan, Covera), 2.5 mg, IA.  Atropine, 0.5 mg, IV. Nicardipine (Cardene), 250 mcg, intracoronary.  Nitroglycerin, 100 mcg, intracoronary. Heparin, 4000 units, IV. Normal  Saline .9%, 250, IV, other. Neosynephrine (Phenylephrine), 1 mcg, IV.  Integrilin (2 mg/mL), 6.8 ml, IV. Integrilin (2 mg/mL), 6.8 ml, IV.  VENTRICLES: EF estimated was 40 %.  CORONARY VESSELS: The coronary circulation is right dominant.  LM:   --  LM: Angiography showed minor luminal irregularities with no flow  limiting lesions.  LAD:   --  Mid LAD: There was a 25 % stenosis.  --  D1: There was a 50 % stenosis.  CX:   --  OM1: There was a 100 % stenosis.  RCA:   --  Proximal RCA: There was a 80 % stenosis.  --  Mid RCA: There was a 100 % stenosis.  --  RPDA: There was a 80 % stenosis.  COMPLICATIONS: There were no complications.  DIAGNOSTIC RECOMMENDATIONS: ASA and Plavix for 1 year.  INTERVENTIONAL RECOMMENDATIONS: ASA and Plavix for 1 year.  Prepared and signed by  Roman Ramirez M.D.  Signed 2018 13:28:39  HEMODYNAMIC TABLES  Pressures:  Baseline  Pressures:  - HR: 91  Pressures:  - Rhythm:  Pressures:  -- Aortic Pressure (S/D/M): 140/92/111  Pressures:  -- Left Ventricle (s/edp): 129/36/--  Pressures:  Intervention  Pressures:  - HR: 87  Pressures:  - Rhythm:  Pressures:  -- Aortic Pressure (S/D/M): 85/61/72  Outputs:  Baseline  Outputs:  -- CALCULATIONS[age]  Outputs:  -- CALCULATIONS: Body Surface Area: 1.73  Outputs:  -- CALCULATIONS: Height in cm: 157.00  Outputs:  -- CALCULATIONS: Sex: Male  Outputs:  -- CALCULATIONS: Weight in k.60    < end of copied text >        RADIOLOGY:      < from: NM Hepatobiliary Scan w/wo Gall Bladder (18 @ 10:22) >    EXAM:  NM HEPATOBILIARY IMG                                PROCEDURE DATE:  2018          INTERPRETATION:  RADIOPHARMACEUTICAL: 3.1 and 3.3 mCi [id number]-mebrofenin,   I.V.; 2 doses    CLINICAL INFORMATION: 58 year old male with cholelithiasis, abdominal   pain, and borderline gallbladder wall thickening on ultrasound; referred   to evaluate for acute cholecystitis.    TECHNIQUE:  Dynamic images of the anterior abdomen were obtained for 2   hours following radiopharmaceutical injection followed by static images   of the abdomen in the anterior, right anterior oblique and right lateral   projections. Morphine 4 mg I.V. and a second dose of radiopharmaceutical   were administered at approximately 80 minutes.    COMPARISON: No previous hepatobiliary scans were available for   comparison.     FINDINGS: There is prompt, homogeneous uptake of radiopharmaceutical by   the hepatocytes. Activity is first seen in the bowel at about 20 minutes.   The gallbladder is not visualized at any time during the study, despite   morphine administration. There is good clearance of activity from the   liver at the end of the study.    IMPRESSION: Abnormal morphine-augmented hepatobiliary scan: acute   cholecystitis.    Dr. NATHALIA Tarango was informed of these results by Dr. SARAH Hurd via   telephone with read back at about 11:45 AM on 2018                    ARY HURD M.D., CHIEF OF NUCLEAR MEDICINE  This document has been electronically signed. Dec  5 2018 11:43AM                < end of copied text >      < from: US Gallbladder (18 @ 06:40) >    EXAM:  US GALLBLADDER                            PROCEDURE DATE:  2018            INTERPRETATION:  Clinical indication: Epigastric pain. Known gallstones.    Technique: Gallbladder ultrasound was performed.    Comparison: 2018.    Findings:     LIVER: 60.3 cm in length. Mildly increased echogenicity, suggestive of   fatty infiltration.  GALLBLADDER: Gallstone and sludge. Borderline gallbladder wall (0.3 cm).   No pericholecystic fluid. Negative sonographic Torres sign. However, the   patient was premedicated.  BILIARY: No intrahepatic or extrahepatic biliary dilatation. Visualized   common bile duct measuring up to 0.3 cm.   PANCREAS: Poorly visualized due to bowel gas.  RIGHT KIDNEY: 11.6 cm in length. No hydronephrosis or obvious renal   stone. A 1.6 x 1.6 x 1.4 cm cyst in the upper pole.  ADDITIONAL: No ascites.     Impression:    Sonographic findings equivocal for acute cholecystitis. If there is   clinical suspicion for acute cholecystitis, a hepatobiliary scan may be   obtained for further evaluation.    Additional findings as described.                    SHIRA SEXTON M.D., ATTENDING RADIOLOGIST  This document has been electronically signed. Dec  5 2018  6:52AM                < end of copied text >    OTHER: 	  	  LABS:	 	    CARDIAC MARKERS:                                  14.5   5.8   )-----------( 242      ( 05 Dec 2018 05:16 )             42.3     12-    139  |  101  |  14  ----------------------------<  138<H>  4.8   |  25  |  1.04    Ca    9.1      05 Dec 2018 05:16    TPro  7.4  /  Alb  4.6  /  TBili  0.3  /  DBili  x   /  AST  24  /  ALT  38  /  AlkPhos  63  12-05    proBNP:   Lipid Profile:   HgA1c:   TSH:

## 2018-12-05 NOTE — ED PROVIDER NOTE - PROGRESS NOTE DETAILS
Dr. Tarango Note: s/o from Dr. Christian night attending at 7:45am pending HIDA and surgical eval.  Spoke to radiologist 30min ago which confirmed +HIDA c/w cholecystitis.  Educated pt and son about dx and treatment plan which may include surgical intervention...pt amenable to this plan and stable for admission.  Surgeon on-board, admit.  Will start antibiotics.

## 2018-12-05 NOTE — H&P ADULT - ASSESSMENT
58M Hx cardiac stent March 2018 on ASA/Plavix, Htn, Hld presents with 1 day of acute cholecystitis.    - Admit to surgery.  - IV antibiotics with cefotetan.   - NPO  - Pain medications IV PRN  - C/w home meds, holding ASA/Plavix at this time  - Discussed with surgeon    Red  [id number] 58M Hx cardiac stent March 2018 on ASA/Plavix, Htn, Hld presents with 1 day of acute cholecystitis.    - Admit to surgery.  - IV antibiotics with cefotetan.   - NPO  - Pain medications IV PRN  - C/w home meds, holding ASA/Plavix at this time  - f/u cards clearance for sx  - plan for lap nathan poss open   - Discussed with surgeon    Red  [id number]

## 2018-12-05 NOTE — ED ADULT NURSE REASSESSMENT NOTE - NS ED NURSE REASSESS COMMENT FT1
1354 pt pending OR today  pt instructed to take all cloths off and bag provided and NPO status No PO meds given as ordered and fluids infusing Mpuleorn

## 2018-12-05 NOTE — ED ADULT NURSE NOTE - NSIMPLEMENTINTERV_GEN_ALL_ED
Implemented All Universal Safety Interventions:  Reva to call system. Call bell, personal items and telephone within reach. Instruct patient to call for assistance. Room bathroom lighting operational. Non-slip footwear when patient is off stretcher. Physically safe environment: no spills, clutter or unnecessary equipment. Stretcher in lowest position, wheels locked, appropriate side rails in place.

## 2018-12-05 NOTE — H&P ADULT - HISTORY OF PRESENT ILLNESS
58M Hx cardiac stent March 2018 on ASA/Plavix, Htn, Hld presents with 1 day of RUQ abdominal pain. The pain began in the middle of the night and has been severe. He has had similar episodes in the past year but they've been intermittent and less severe. This episode has been associated with nausea and NBNB emesis. No fevers.  He's been diagnosed with gallstones in the past but has never spoke with a surgeon about removing his gallbladder.

## 2018-12-06 LAB
ALBUMIN SERPL ELPH-MCNC: 3.7 G/DL — SIGNIFICANT CHANGE UP (ref 3.3–5)
ALP SERPL-CCNC: 56 U/L — SIGNIFICANT CHANGE UP (ref 40–120)
ALT FLD-CCNC: 30 U/L — SIGNIFICANT CHANGE UP (ref 10–45)
ANION GAP SERPL CALC-SCNC: 12 MMOL/L — SIGNIFICANT CHANGE UP (ref 5–17)
APPEARANCE UR: CLEAR — SIGNIFICANT CHANGE UP
APTT BLD: 28.4 SEC — SIGNIFICANT CHANGE UP (ref 27.5–36.3)
AST SERPL-CCNC: 23 U/L — SIGNIFICANT CHANGE UP (ref 10–40)
BACTERIA # UR AUTO: NEGATIVE — SIGNIFICANT CHANGE UP
BILIRUB DIRECT SERPL-MCNC: <0.1 MG/DL — SIGNIFICANT CHANGE UP (ref 0–0.2)
BILIRUB INDIRECT FLD-MCNC: >0.3 MG/DL — SIGNIFICANT CHANGE UP (ref 0.2–1)
BILIRUB SERPL-MCNC: 0.4 MG/DL — SIGNIFICANT CHANGE UP (ref 0.2–1.2)
BILIRUB UR-MCNC: NEGATIVE — SIGNIFICANT CHANGE UP
BLD GP AB SCN SERPL QL: NEGATIVE — SIGNIFICANT CHANGE UP
BUN SERPL-MCNC: 11 MG/DL — SIGNIFICANT CHANGE UP (ref 7–23)
CALCIUM SERPL-MCNC: 9 MG/DL — SIGNIFICANT CHANGE UP (ref 8.4–10.5)
CHLORIDE SERPL-SCNC: 104 MMOL/L — SIGNIFICANT CHANGE UP (ref 96–108)
CO2 SERPL-SCNC: 26 MMOL/L — SIGNIFICANT CHANGE UP (ref 22–31)
COLOR SPEC: COLORLESS — SIGNIFICANT CHANGE UP
CREAT SERPL-MCNC: 0.88 MG/DL — SIGNIFICANT CHANGE UP (ref 0.5–1.3)
DIFF PNL FLD: NEGATIVE — SIGNIFICANT CHANGE UP
EPI CELLS # UR: 0 /HPF — SIGNIFICANT CHANGE UP
GLUCOSE SERPL-MCNC: 103 MG/DL — HIGH (ref 70–99)
GLUCOSE UR QL: NEGATIVE — SIGNIFICANT CHANGE UP
HCT VFR BLD CALC: 38.4 % — LOW (ref 39–50)
HGB BLD-MCNC: 12.9 G/DL — LOW (ref 13–17)
HYALINE CASTS # UR AUTO: 0 /LPF — SIGNIFICANT CHANGE UP (ref 0–2)
INR BLD: 1.13 RATIO — SIGNIFICANT CHANGE UP (ref 0.88–1.16)
KETONES UR-MCNC: NEGATIVE — SIGNIFICANT CHANGE UP
LEUKOCYTE ESTERASE UR-ACNC: NEGATIVE — SIGNIFICANT CHANGE UP
MCHC RBC-ENTMCNC: 27.9 PG — SIGNIFICANT CHANGE UP (ref 27–34)
MCHC RBC-ENTMCNC: 33.6 GM/DL — SIGNIFICANT CHANGE UP (ref 32–36)
MCV RBC AUTO: 83.1 FL — SIGNIFICANT CHANGE UP (ref 80–100)
NITRITE UR-MCNC: NEGATIVE — SIGNIFICANT CHANGE UP
PH UR: 6.5 — SIGNIFICANT CHANGE UP (ref 5–8)
PLATELET # BLD AUTO: 198 K/UL — SIGNIFICANT CHANGE UP (ref 150–400)
POTASSIUM SERPL-MCNC: 4.1 MMOL/L — SIGNIFICANT CHANGE UP (ref 3.5–5.3)
POTASSIUM SERPL-SCNC: 4.1 MMOL/L — SIGNIFICANT CHANGE UP (ref 3.5–5.3)
PROT SERPL-MCNC: 6.1 G/DL — SIGNIFICANT CHANGE UP (ref 6–8.3)
PROT UR-MCNC: NEGATIVE — SIGNIFICANT CHANGE UP
PROTHROM AB SERPL-ACNC: 12.9 SEC — SIGNIFICANT CHANGE UP (ref 10–13.1)
RBC # BLD: 4.62 M/UL — SIGNIFICANT CHANGE UP (ref 4.2–5.8)
RBC # FLD: 13.2 % — SIGNIFICANT CHANGE UP (ref 10.3–14.5)
RBC CASTS # UR COMP ASSIST: 0 /HPF — SIGNIFICANT CHANGE UP (ref 0–4)
RH IG SCN BLD-IMP: POSITIVE — SIGNIFICANT CHANGE UP
SODIUM SERPL-SCNC: 142 MMOL/L — SIGNIFICANT CHANGE UP (ref 135–145)
SP GR SPEC: 1.01 — SIGNIFICANT CHANGE UP (ref 1.01–1.02)
UROBILINOGEN FLD QL: NEGATIVE — SIGNIFICANT CHANGE UP
WBC # BLD: 4.54 K/UL — SIGNIFICANT CHANGE UP (ref 3.8–10.5)
WBC # FLD AUTO: 4.54 K/UL — SIGNIFICANT CHANGE UP (ref 3.8–10.5)
WBC UR QL: 0 /HPF — SIGNIFICANT CHANGE UP (ref 0–5)

## 2018-12-06 RX ORDER — ONDANSETRON 8 MG/1
4 TABLET, FILM COATED ORAL ONCE
Qty: 0 | Refills: 0 | Status: DISCONTINUED | OUTPATIENT
Start: 2018-12-06 | End: 2018-12-07

## 2018-12-06 RX ORDER — METOPROLOL TARTRATE 50 MG
25 TABLET ORAL DAILY
Qty: 0 | Refills: 0 | Status: DISCONTINUED | OUTPATIENT
Start: 2018-12-06 | End: 2018-12-07

## 2018-12-06 RX ORDER — HYDROMORPHONE HYDROCHLORIDE 2 MG/ML
1 INJECTION INTRAMUSCULAR; INTRAVENOUS; SUBCUTANEOUS
Qty: 0 | Refills: 0 | Status: DISCONTINUED | OUTPATIENT
Start: 2018-12-06 | End: 2018-12-07

## 2018-12-06 RX ORDER — SODIUM CHLORIDE 9 MG/ML
1000 INJECTION, SOLUTION INTRAVENOUS
Qty: 0 | Refills: 0 | Status: DISCONTINUED | OUTPATIENT
Start: 2018-12-06 | End: 2018-12-07

## 2018-12-06 RX ORDER — OXYCODONE AND ACETAMINOPHEN 5; 325 MG/1; MG/1
2 TABLET ORAL EVERY 6 HOURS
Qty: 0 | Refills: 0 | Status: DISCONTINUED | OUTPATIENT
Start: 2018-12-06 | End: 2018-12-07

## 2018-12-06 RX ORDER — ACETAMINOPHEN 500 MG
325 TABLET ORAL ONCE
Qty: 0 | Refills: 0 | Status: COMPLETED | OUTPATIENT
Start: 2018-12-06 | End: 2018-12-06

## 2018-12-06 RX ORDER — SODIUM CHLORIDE 9 MG/ML
1000 INJECTION, SOLUTION INTRAVENOUS
Qty: 0 | Refills: 0 | Status: DISCONTINUED | OUTPATIENT
Start: 2018-12-06 | End: 2018-12-06

## 2018-12-06 RX ORDER — OXYCODONE AND ACETAMINOPHEN 5; 325 MG/1; MG/1
1 TABLET ORAL EVERY 4 HOURS
Qty: 0 | Refills: 0 | Status: DISCONTINUED | OUTPATIENT
Start: 2018-12-06 | End: 2018-12-07

## 2018-12-06 RX ORDER — CHLORHEXIDINE GLUCONATE 213 G/1000ML
1 SOLUTION TOPICAL ONCE
Qty: 0 | Refills: 0 | Status: COMPLETED | OUTPATIENT
Start: 2018-12-06 | End: 2018-12-06

## 2018-12-06 RX ORDER — ATORVASTATIN CALCIUM 80 MG/1
80 TABLET, FILM COATED ORAL AT BEDTIME
Qty: 0 | Refills: 0 | Status: DISCONTINUED | OUTPATIENT
Start: 2018-12-06 | End: 2018-12-07

## 2018-12-06 RX ORDER — HYDROMORPHONE HYDROCHLORIDE 2 MG/ML
0.5 INJECTION INTRAMUSCULAR; INTRAVENOUS; SUBCUTANEOUS
Qty: 0 | Refills: 0 | Status: DISCONTINUED | OUTPATIENT
Start: 2018-12-06 | End: 2018-12-07

## 2018-12-06 RX ORDER — LISINOPRIL 2.5 MG/1
2.5 TABLET ORAL DAILY
Qty: 0 | Refills: 0 | Status: DISCONTINUED | OUTPATIENT
Start: 2018-12-06 | End: 2018-12-07

## 2018-12-06 RX ORDER — HEPARIN SODIUM 5000 [USP'U]/ML
5000 INJECTION INTRAVENOUS; SUBCUTANEOUS EVERY 8 HOURS
Qty: 0 | Refills: 0 | Status: DISCONTINUED | OUTPATIENT
Start: 2018-12-06 | End: 2018-12-07

## 2018-12-06 RX ADMIN — CHLORHEXIDINE GLUCONATE 1 APPLICATION(S): 213 SOLUTION TOPICAL at 08:47

## 2018-12-06 RX ADMIN — SODIUM CHLORIDE 125 MILLILITER(S): 9 INJECTION, SOLUTION INTRAVENOUS at 05:52

## 2018-12-06 RX ADMIN — PIPERACILLIN AND TAZOBACTAM 25 GRAM(S): 4; .5 INJECTION, POWDER, LYOPHILIZED, FOR SOLUTION INTRAVENOUS at 13:52

## 2018-12-06 RX ADMIN — Medication 25 MILLIGRAM(S): at 05:53

## 2018-12-06 RX ADMIN — Medication 325 MILLIGRAM(S): at 08:47

## 2018-12-06 RX ADMIN — SODIUM CHLORIDE 100 MILLILITER(S): 9 INJECTION, SOLUTION INTRAVENOUS at 11:07

## 2018-12-06 RX ADMIN — Medication 1000 MILLIGRAM(S): at 00:18

## 2018-12-06 RX ADMIN — LISINOPRIL 2.5 MILLIGRAM(S): 2.5 TABLET ORAL at 05:53

## 2018-12-06 RX ADMIN — ENOXAPARIN SODIUM 40 MILLIGRAM(S): 100 INJECTION SUBCUTANEOUS at 12:03

## 2018-12-06 RX ADMIN — PIPERACILLIN AND TAZOBACTAM 25 GRAM(S): 4; .5 INJECTION, POWDER, LYOPHILIZED, FOR SOLUTION INTRAVENOUS at 05:54

## 2018-12-06 RX ADMIN — Medication 325 MILLIGRAM(S): at 11:06

## 2018-12-06 NOTE — PROGRESS NOTE ADULT - SUBJECTIVE AND OBJECTIVE BOX
Subjective: Patient seen and examined. No new events except as noted.   pain controlled   no cp or sob   REVIEW OF SYSTEMS:    CONSTITUTIONAL: No weakness, fevers or chills  EYES/ENT: No visual changes;  No vertigo or throat pain   NECK: No pain or stiffness  RESPIRATORY: No cough, wheezing, hemoptysis; No shortness of breath  CARDIOVASCULAR: No chest pain or palpitations  GASTROINTESTINAL: + abdominal or epigastric pain. No nausea, vomiting, or hematemesis; No diarrhea or constipation. No melena or hematochezia.  GENITOURINARY: No dysuria, frequency or hematuria  NEUROLOGICAL: No numbness or weakness  SKIN: No itching, burning, rashes, or lesions   All other review of systems is negative unless indicated above.    MEDICATIONS:  MEDICATIONS  (STANDING):  atorvastatin 80 milliGRAM(s) Oral at bedtime  enoxaparin Injectable 40 milliGRAM(s) SubCutaneous daily  lactated ringers. 1000 milliLiter(s) (125 mL/Hr) IV Continuous <Continuous>  lisinopril 2.5 milliGRAM(s) Oral daily  metoprolol succinate ER 25 milliGRAM(s) Oral daily  piperacillin/tazobactam IVPB. 3.375 Gram(s) IV Intermittent every 8 hours      PHYSICAL EXAM:  T(C): 36.6 (12-06-18 @ 09:05), Max: 36.8 (12-05-18 @ 11:19)  HR: 86 (12-06-18 @ 09:05) (68 - 86)  BP: 115/73 (12-06-18 @ 09:05) (106/78 - 129/79)  RR: 16 (12-06-18 @ 09:05) (14 - 16)  SpO2: 97% (12-06-18 @ 09:05) (95% - 99%)  Wt(kg): --  I&O's Summary    05 Dec 2018 07:01  -  06 Dec 2018 07:00  --------------------------------------------------------  IN: 750 mL / OUT: 0 mL / NET: 750 mL      Height (cm): 165.1 (12-06 @ 05:05)  Weight (kg): 83.5 (12-06 @ 05:05)  BMI (kg/m2): 30.6 (12-06 @ 05:05)  BSA (m2): 1.91 (12-06 @ 05:05)    Appearance: NAD  HEENT:   Normal oral mucosa, PERRL, EOMI	  Lymphatic: No lymphadenopathy , no edema  Cardiovascular: Normal S1 S2, No JVD, No murmurs , Peripheral pulses palpable 2+ bilaterally  Respiratory: Lungs clear to auscultation, normal effort 	  Gastrointestinal:  Soft, Non-tender, + BS	  Skin: No rashes, No ecchymoses, No cyanosis, warm to touch  Musculoskeletal: Normal range of motion, normal strength  Psychiatry:  Mood & affect appropriate  Ext: No edema      LABS:    CARDIAC MARKERS:                                12.9   4.54  )-----------( 198      ( 06 Dec 2018 07:47 )             38.4     12-06    142  |  104  |  11  ----------------------------<  103<H>  4.1   |  26  |  0.88    Ca    9.0      06 Dec 2018 07:02    TPro  6.1  /  Alb  3.7  /  TBili  0.4  /  DBili  <0.1  /  AST  23  /  ALT  30  /  AlkPhos  56  12-06    proBNP:   Lipid Profile:   HgA1c:   TSH:     0          TELEMETRY: 	    ECG:  	  RADIOLOGY:   DIAGNOSTIC TESTING:  [ ] Echocardiogram:  [ ]  Catheterization:  [ ] Stress Test:    OTHER:

## 2018-12-06 NOTE — PROGRESS NOTE ADULT - PROBLEM SELECTOR PLAN 1
Awaiting Lap Loretta. May proceed with acceptable cardiac risk with planned surgery. METS > 4 and no anginal symptoms.   IV Abx.

## 2018-12-06 NOTE — PROGRESS NOTE ADULT - SUBJECTIVE AND OBJECTIVE BOX
Red Team Surgery Progress Note    SUBJECTIVE: Patient seen and examined at the bedside. No acute events overnight. Feeling well this morning. has been NPO without nausea or vomiting. Denies pain at this time. Seen by cardiology, awaiting recs    VITALS  T(C): 36.5 (12-06-18 @ 06:06), Max: 36.8 (12-05-18 @ 11:19)  HR: 75 (12-06-18 @ 06:06) (68 - 75)  BP: 128/80 (12-06-18 @ 06:06) (106/78 - 129/79)  RR: 16 (12-06-18 @ 06:06) (14 - 16)  SpO2: 99% (12-06-18 @ 06:06) (95% - 99%)      Is/Os    PHYSICAL EXAM:   General: NAD, Lying in bed comfortably, alert, oriented x3  Pulm: Non-labored breathing  GI/Abd: Soft, NT/ND, no rebound/guarding    MEDICATIONS (STANDING): atorvastatin 80 milliGRAM(s) Oral at bedtime  enoxaparin Injectable 40 milliGRAM(s) SubCutaneous daily  lactated ringers. 1000 milliLiter(s) IV Continuous <Continuous>  lisinopril 2.5 milliGRAM(s) Oral daily  metoprolol succinate ER 25 milliGRAM(s) Oral daily  piperacillin/tazobactam IVPB. 3.375 Gram(s) IV Intermittent every 8 hours    MEDICATIONS (PRN):ondansetron Injectable 4 milliGRAM(s) IV Push every 6 hours PRN Nausea    LABS  CBC (12-05 @ 05:16)                              14.5                           5.8     )----------------(  242        70.5  % Neutrophils, 21.9  % Lymphocytes, ANC: 4.1                                 42.3      BMP (12-05 @ 05:16)             139     |  101     |  14    		Ca++ --      Ca 9.1                ---------------------------------( 138<H>		Mg --                 4.8     |  25      |  1.04  			Ph --        LFTs (12-05 @ 05:16)      TPro 7.4 / Alb 4.6 / TBili 0.3 / DBili -- / AST 24 / ALT 38 / AlkPhos 63    Coags (12-05 @ 05:16)  aPTT 29.4 / INR 1.08 / PT 12.4        VBG (12-05 @ 05:16)     7.35 / 57<H> / 30 / 31<H> / 4.3<H> / 44<L>%     Lactate: 2.0

## 2018-12-06 NOTE — PRE-ANESTHESIA EVALUATION ADULT - NSANTHOSAYNRD_GEN_A_CORE
No. SHAUNNA screening performed.  STOP BANG Legend: 0-2 = LOW Risk; 3-4 = INTERMEDIATE Risk; 5-8 = HIGH Risk

## 2018-12-06 NOTE — BRIEF OPERATIVE NOTE - PROCEDURE
<<-----Click on this checkbox to enter Procedure Laparoscopic cholecystectomy  12/06/2018    Active  [id number]

## 2018-12-06 NOTE — PROGRESS NOTE ADULT - ASSESSMENT
58M Hx cardiac stent March 2018 on ASA/Plavix, Htn, Hld presents with acute cholecystitis.    - OR today, preoped, needs consent  - f/u cardiology clearance  - IV antibiotics with cefotetan.   - NPO except meds  - Pain medications IV PRN  - C/w home meds, holding ASA/Plavix at this time

## 2018-12-07 ENCOUNTER — TRANSCRIPTION ENCOUNTER (OUTPATIENT)
Age: 58
End: 2018-12-07

## 2018-12-07 ENCOUNTER — RESULT REVIEW (OUTPATIENT)
Age: 58
End: 2018-12-07

## 2018-12-07 VITALS
HEART RATE: 80 BPM | SYSTOLIC BLOOD PRESSURE: 127 MMHG | TEMPERATURE: 99 F | OXYGEN SATURATION: 96 % | DIASTOLIC BLOOD PRESSURE: 80 MMHG | RESPIRATION RATE: 18 BRPM

## 2018-12-07 LAB
ALBUMIN SERPL ELPH-MCNC: 3.7 G/DL — SIGNIFICANT CHANGE UP (ref 3.3–5)
ALP SERPL-CCNC: 58 U/L — SIGNIFICANT CHANGE UP (ref 40–120)
ALT FLD-CCNC: 65 U/L — HIGH (ref 10–45)
ANION GAP SERPL CALC-SCNC: 16 MMOL/L — SIGNIFICANT CHANGE UP (ref 5–17)
AST SERPL-CCNC: 49 U/L — HIGH (ref 10–40)
BILIRUB DIRECT SERPL-MCNC: <0.1 MG/DL — SIGNIFICANT CHANGE UP (ref 0–0.2)
BILIRUB INDIRECT FLD-MCNC: >0.2 MG/DL — SIGNIFICANT CHANGE UP (ref 0.2–1)
BILIRUB SERPL-MCNC: 0.3 MG/DL — SIGNIFICANT CHANGE UP (ref 0.2–1.2)
BUN SERPL-MCNC: 11 MG/DL — SIGNIFICANT CHANGE UP (ref 7–23)
CALCIUM SERPL-MCNC: 8.4 MG/DL — SIGNIFICANT CHANGE UP (ref 8.4–10.5)
CHLORIDE SERPL-SCNC: 103 MMOL/L — SIGNIFICANT CHANGE UP (ref 96–108)
CO2 SERPL-SCNC: 25 MMOL/L — SIGNIFICANT CHANGE UP (ref 22–31)
CREAT SERPL-MCNC: 0.89 MG/DL — SIGNIFICANT CHANGE UP (ref 0.5–1.3)
GLUCOSE SERPL-MCNC: 137 MG/DL — HIGH (ref 70–99)
HCT VFR BLD CALC: 37.8 % — LOW (ref 39–50)
HGB BLD-MCNC: 12.5 G/DL — LOW (ref 13–17)
MCHC RBC-ENTMCNC: 27.5 PG — SIGNIFICANT CHANGE UP (ref 27–34)
MCHC RBC-ENTMCNC: 33.1 GM/DL — SIGNIFICANT CHANGE UP (ref 32–36)
MCV RBC AUTO: 83.3 FL — SIGNIFICANT CHANGE UP (ref 80–100)
PLATELET # BLD AUTO: 212 K/UL — SIGNIFICANT CHANGE UP (ref 150–400)
POTASSIUM SERPL-MCNC: 4 MMOL/L — SIGNIFICANT CHANGE UP (ref 3.5–5.3)
POTASSIUM SERPL-SCNC: 4 MMOL/L — SIGNIFICANT CHANGE UP (ref 3.5–5.3)
PROT SERPL-MCNC: 6 G/DL — SIGNIFICANT CHANGE UP (ref 6–8.3)
RBC # BLD: 4.54 M/UL — SIGNIFICANT CHANGE UP (ref 4.2–5.8)
RBC # FLD: 13.1 % — SIGNIFICANT CHANGE UP (ref 10.3–14.5)
SODIUM SERPL-SCNC: 144 MMOL/L — SIGNIFICANT CHANGE UP (ref 135–145)
WBC # BLD: 6.8 K/UL — SIGNIFICANT CHANGE UP (ref 3.8–10.5)
WBC # FLD AUTO: 6.8 K/UL — SIGNIFICANT CHANGE UP (ref 3.8–10.5)

## 2018-12-07 PROCEDURE — 88304 TISSUE EXAM BY PATHOLOGIST: CPT | Mod: 26

## 2018-12-07 RX ORDER — OXYCODONE AND ACETAMINOPHEN 5; 325 MG/1; MG/1
1 TABLET ORAL
Qty: 20 | Refills: 0
Start: 2018-12-07

## 2018-12-07 RX ADMIN — HEPARIN SODIUM 5000 UNIT(S): 5000 INJECTION INTRAVENOUS; SUBCUTANEOUS at 13:28

## 2018-12-07 RX ADMIN — LISINOPRIL 2.5 MILLIGRAM(S): 2.5 TABLET ORAL at 06:00

## 2018-12-07 RX ADMIN — Medication 25 MILLIGRAM(S): at 06:00

## 2018-12-07 RX ADMIN — HEPARIN SODIUM 5000 UNIT(S): 5000 INJECTION INTRAVENOUS; SUBCUTANEOUS at 06:00

## 2018-12-07 NOTE — DISCHARGE NOTE ADULT - MEDICATION SUMMARY - MEDICATIONS TO TAKE
I will START or STAY ON the medications listed below when I get home from the hospital:    aspirin 81 mg oral delayed release tablet  -- 1 tab(s) by mouth once a day  -- Indication: For CArdiac stents     Percocet 5/325 oral tablet  -- 1 tab(s) by mouth every 6 hours, As Needed -for moderate pain MDD:MDD 6 tablets   -- Caution federal law prohibits the transfer of this drug to any person other  than the person for whom it was prescribed.  May cause drowsiness.  Alcohol may intensify this effect.  Use care when operating dangerous machinery.  This prescription cannot be refilled.  This product contains acetaminophen.  Do not use  with any other product containing acetaminophen to prevent possible liver damage.  Using more of this medication than prescribed may cause serious breathing problems.    -- Indication: For post op pain     lisinopril 2.5 mg oral tablet  -- 1 tab(s) by mouth once a day  -- Indication: For HTN    atorvastatin 80 mg oral tablet  -- 1 tab(s) by mouth once a day (at bedtime)  -- Indication: For HLD    clopidogrel 75 mg oral tablet  -- 1 tab(s) by mouth once a day  -- Indication: For Stents RESUME MONDAY 12/10/18    metoprolol succinate 25 mg oral tablet, extended release  -- 1 tab(s) by mouth once a day  -- Indication: For HTN I will START or STAY ON the medications listed below when I get home from the hospital:    aspirin 81 mg oral delayed release tablet  -- 1 tab(s) by mouth once a day  -- Indication: For CArdiac stents     Percocet 5/325 oral tablet  -- 1 tab(s) by mouth every 6 hours, As Needed -for moderate pain MDD:MDD 6 tablets  -- Caution federal law prohibits the transfer of this drug to any person other  than the person for whom it was prescribed.  May cause drowsiness.  Alcohol may intensify this effect.  Use care when operating dangerous machinery.  This prescription cannot be refilled.  This product contains acetaminophen.  Do not use  with any other product containing acetaminophen to prevent possible liver damage.  Using more of this medication than prescribed may cause serious breathing problems.    -- Indication: For pain    lisinopril 2.5 mg oral tablet  -- 1 tab(s) by mouth once a day  -- Indication: For HTN    atorvastatin 80 mg oral tablet  -- 1 tab(s) by mouth once a day (at bedtime)  -- Indication: For HLD    clopidogrel 75 mg oral tablet  -- 1 tab(s) by mouth once a day  -- Indication: For Stents RESUME MONDAY 12/10/18    metoprolol succinate 25 mg oral tablet, extended release  -- 1 tab(s) by mouth once a day  -- Indication: For HTN

## 2018-12-07 NOTE — DISCHARGE NOTE ADULT - MEDICATION SUMMARY - MEDICATIONS TO CHANGE
I will SWITCH the dose or number of times a day I take the medications listed below when I get home from the hospital:    clopidogrel 75 mg oral tablet  -- 1 tab(s) by mouth once a day

## 2018-12-07 NOTE — PROGRESS NOTE ADULT - SUBJECTIVE AND OBJECTIVE BOX
Red Team Surgery Progress Note and Postoperative Check    SUBJECTIVE: Patient seen and examined at the bedside. No acute events since surgery. Feeling well this morning. Tolerating regular diet without nausea or vomiting. Pain is well controlled. Ambulating well.     VITALS  T(C): 37.4 (12-07-18 @ 05:38), Max: 37.4 (12-07-18 @ 05:38)  HR: 86 (12-07-18 @ 05:38) (61 - 89)  BP: 119/66 (12-07-18 @ 05:38) (112/74 - 149/94)  RR: 18 (12-07-18 @ 05:38) (12 - 18)  SpO2: 95% (12-07-18 @ 05:38) (94% - 100%)    Is/Os    12-06 @ 07:01  -  12-07 @ 07:00  --------------------------------------------------------  IN:    dextrose 5% + sodium chloride 0.45%.: 600 mL    IV PiggyBack: 100 mL    lactated ringers.: 800 mL  Total IN: 1500 mL    OUT:    Voided: 900 mL  Total OUT: 900 mL    Total NET: 600 mL    PHYSICAL EXAM:   General: NAD, Lying in bed comfortably, alert, oriented x3  Pulm: Non-labored breathing  GI/Abd: Soft, NT/ND, no rebound/guarding, laparoscopic dressings in place, umbilical dressing saturated with s/s fluid, changed on AM rounds    MEDICATIONS (STANDING): atorvastatin 80 milliGRAM(s) Oral at bedtime  heparin  Injectable 5000 Unit(s) SubCutaneous every 8 hours  lactated ringers. 1000 milliLiter(s) IV Continuous <Continuous>  lisinopril 2.5 milliGRAM(s) Oral daily  metoprolol succinate ER 25 milliGRAM(s) Oral daily    MEDICATIONS (PRN):oxyCODONE    5 mG/acetaminophen 325 mG 1 Tablet(s) Oral every 4 hours PRN Moderate Pain (4 - 6)  oxyCODONE    5 mG/acetaminophen 325 mG 2 Tablet(s) Oral every 6 hours PRN Severe Pain (7 - 10)    LABS  CBC (12-06 @ 07:47)                              12.9<L>                         4.54    )----------------(  198        --    % Neutrophils, --    % Lymphocytes, ANC: --                                  38.4<L>    BMP (12-06 @ 07:02)             142     |  104     |  11    		Ca++ --      Ca 9.0                ---------------------------------( 103<H>		Mg --                 4.1     |  26      |  0.88  			Ph --        LFTs (12-06 @ 07:02)      TPro 6.1 / Alb 3.7 / TBili 0.4 / DBili <0.1 / AST 23 / ALT 30 / AlkPhos 56    Coags (12-06 @ 07:47)  aPTT 28.4 / INR 1.13 / PT 12.9

## 2018-12-07 NOTE — PROGRESS NOTE ADULT - SUBJECTIVE AND OBJECTIVE BOX
Subjective: Patient seen and examined. No new events except as noted.   s/p Lap nathan   +flatus   tolerated PO   pain controlled     REVIEW OF SYSTEMS:    CONSTITUTIONAL: +weakness, fevers or chills  EYES/ENT: No visual changes;  No vertigo or throat pain   NECK: No pain or stiffness  RESPIRATORY: No cough, wheezing, hemoptysis; No shortness of breath  CARDIOVASCULAR: No chest pain or palpitations  GASTROINTESTINAL: + abdominal or epigastric pain. No nausea, vomiting, or hematemesis; No diarrhea or constipation. No melena or hematochezia.  GENITOURINARY: No dysuria, frequency or hematuria  NEUROLOGICAL: No numbness or weakness  SKIN: No itching, burning, rashes, or lesions   All other review of systems is negative unless indicated above.    MEDICATIONS:  MEDICATIONS  (STANDING):  atorvastatin 80 milliGRAM(s) Oral at bedtime  heparin  Injectable 5000 Unit(s) SubCutaneous every 8 hours  lactated ringers. 1000 milliLiter(s) (100 mL/Hr) IV Continuous <Continuous>  lisinopril 2.5 milliGRAM(s) Oral daily  metoprolol succinate ER 25 milliGRAM(s) Oral daily      PHYSICAL EXAM:  T(C): 37.5 (12-07-18 @ 08:54), Max: 37.5 (12-07-18 @ 08:54)  HR: 75 (12-07-18 @ 08:54) (61 - 89)  BP: 112/65 (12-07-18 @ 08:54) (112/65 - 149/94)  RR: 18 (12-07-18 @ 08:54) (12 - 18)  SpO2: 95% (12-07-18 @ 08:54) (94% - 100%)  Wt(kg): --  I&O's Summary    06 Dec 2018 07:01  -  07 Dec 2018 07:00  --------------------------------------------------------  IN: 1500 mL / OUT: 900 mL / NET: 600 mL    07 Dec 2018 07:01  -  07 Dec 2018 12:41  --------------------------------------------------------  IN: 340 mL / OUT: 2 mL / NET: 338 mL          Appearance: Normal	  HEENT:   Normal oral mucosa, PERRL, EOMI	  Lymphatic: No lymphadenopathy , no edema  Cardiovascular: Normal S1 S2, No JVD, No murmurs , Peripheral pulses palpable 2+ bilaterally  Respiratory: Lungs clear to auscultation, normal effort 	  Gastrointestinal:  Soft, Mildly tender  + BS	  Skin: No rashes, No ecchymoses, No cyanosis, warm to touch  Musculoskeletal: Normal range of motion, normal strength  Psychiatry:  Mood & affect appropriate  Ext: No edema      LABS:    CARDIAC MARKERS:                                12.5   6.80  )-----------( 212      ( 07 Dec 2018 09:26 )             37.8     12-07    144  |  103  |  11  ----------------------------<  137<H>  4.0   |  25  |  0.89    Ca    8.4      07 Dec 2018 07:12    TPro  6.0  /  Alb  3.7  /  TBili  0.3  /  DBili  <0.1  /  AST  49<H>  /  ALT  65<H>  /  AlkPhos  58  12-07    proBNP:   Lipid Profile:   HgA1c:   TSH:     0          TELEMETRY: 	    ECG:  	  RADIOLOGY:   DIAGNOSTIC TESTING:  [ ] Echocardiogram:  [ ]  Catheterization:  [ ] Stress Test:    OTHER:

## 2018-12-07 NOTE — DISCHARGE NOTE ADULT - CARE PLAN
Principal Discharge DX:	Cholecystitis, acute  Goal:	post op recovery  Assessment and plan of treatment:	post op recovery Principal Discharge DX:	Cholecystitis, acute  Goal:	post op recovery  Assessment and plan of treatment:	WOUND CARE: Your wounds are covered with steristrips, please keep steristrips in place, they will fall off on their own. You may apply gauze to the umbilical wound as needed.   BATHING: Please do not submerge wound underwater. You may shower and/or sponge bathe.  ACTIVITY: No heavy lifting or straining for 6 weeks. Otherwise, you may return to your usual level of physical activity. If you are taking narcotic pain medication (such as Percocet) DO NOT drive a car, operate machinery or make important decisions.  DIET: Return to your usual diet.  NOTIFY YOUR SURGEON IF: You have any bleeding that does not stop, any pus draining from your wound(s), any fever (over 100.4 F) or chills, persistent nausea/vomiting, persistent diarrhea, or if your pain is not controlled on your discharge pain medications.  FOLLOW-UP: Please follow-up with Dr. Adrian in the office in 2 weeks from surgery. Please call the office to make an appointment. Please follow up with your primary care physician in one week regarding your hospitalization

## 2018-12-07 NOTE — DISCHARGE NOTE ADULT - CARE PROVIDER_API CALL
Randy Adrian (MD), Surgery  [address] [facility] [address]  [place] [address]  Phone: [phone]  Fax: [phone]    Nima Hammond (), Cardiology; Internal Medicine  [address] [facility]  [address]  [place] [address]  Phone: [phone]  Fax: [phone]

## 2018-12-07 NOTE — PROGRESS NOTE ADULT - ASSESSMENT
58M Hx cardiac stent March 2018 on ASA/Plavix, Htn, Hld now POD1 from lap nathan for acute cholecystitis    - Continue Regular diet  - C/w home meds, touch base with cardiology regarding restarting ASA/Plavix  - OOB as tolerated  - DVT ppx  - d/c planning for later today

## 2018-12-07 NOTE — DISCHARGE NOTE ADULT - HOSPITAL COURSE
58M Hx cardiac stent March 2018 on ASA/Plavix, Htn, Hld presents with 1 day of RUQ abdominal pain. The pain began in the middle of the night and has been severe. He has had similar episodes in the past year but they've been intermittent and less severe. This episode has been associated with nausea and NBNB emesis. No fevers.  He's been diagnosed with gallstones in the past but has never spoke with a surgeon about removing his gallbladder.	  HIDA scan was positive for acute cholecystitis. Cardiology was called for pre op input.   To OR on 12/6/18 and underwent a lap nathan. Post op course was stable   To resume PLAVIX on 12/7/18 Discussed with Dr. Hammond

## 2018-12-07 NOTE — DISCHARGE NOTE ADULT - PATIENT PORTAL LINK FT
You can access the [facility] Patient Portal, offered by [facility], by registering with the following website: [email or web address]

## 2018-12-07 NOTE — DISCHARGE NOTE ADULT - PLAN OF CARE
post op recovery WOUND CARE: Your wounds are covered with steristrips, please keep steristrips in place, they will fall off on their own. You may apply gauze to the umbilical wound as needed.   BATHING: Please do not submerge wound underwater. You may shower and/or sponge bathe.  ACTIVITY: No heavy lifting or straining for 6 weeks. Otherwise, you may return to your usual level of physical activity. If you are taking narcotic pain medication (such as Percocet) DO NOT drive a car, operate machinery or make important decisions.  DIET: Return to your usual diet.  NOTIFY YOUR SURGEON IF: You have any bleeding that does not stop, any pus draining from your wound(s), any fever (over 100.4 F) or chills, persistent nausea/vomiting, persistent diarrhea, or if your pain is not controlled on your discharge pain medications.  FOLLOW-UP: Please follow-up with Dr. Adrian in the office in 2 weeks from surgery. Please call the office to make an appointment. Please follow up with your primary care physician in one week regarding your hospitalization

## 2019-01-09 PROCEDURE — 99285 EMERGENCY DEPT VISIT HI MDM: CPT | Mod: 25

## 2019-01-09 PROCEDURE — 80076 HEPATIC FUNCTION PANEL: CPT

## 2019-01-09 PROCEDURE — 85610 PROTHROMBIN TIME: CPT

## 2019-01-09 PROCEDURE — 76705 ECHO EXAM OF ABDOMEN: CPT

## 2019-01-09 PROCEDURE — 78226 HEPATOBILIARY SYSTEM IMAGING: CPT

## 2019-01-09 PROCEDURE — 84295 ASSAY OF SERUM SODIUM: CPT

## 2019-01-09 PROCEDURE — A9537: CPT

## 2019-01-09 PROCEDURE — 80048 BASIC METABOLIC PNL TOTAL CA: CPT

## 2019-01-09 PROCEDURE — 85730 THROMBOPLASTIN TIME PARTIAL: CPT

## 2019-01-09 PROCEDURE — 82435 ASSAY OF BLOOD CHLORIDE: CPT

## 2019-01-09 PROCEDURE — 71045 X-RAY EXAM CHEST 1 VIEW: CPT

## 2019-01-09 PROCEDURE — 82330 ASSAY OF CALCIUM: CPT

## 2019-01-09 PROCEDURE — 82947 ASSAY GLUCOSE BLOOD QUANT: CPT

## 2019-01-09 PROCEDURE — 85014 HEMATOCRIT: CPT

## 2019-01-09 PROCEDURE — 83605 ASSAY OF LACTIC ACID: CPT

## 2019-01-09 PROCEDURE — 93005 ELECTROCARDIOGRAM TRACING: CPT

## 2019-01-09 PROCEDURE — 96366 THER/PROPH/DIAG IV INF ADDON: CPT

## 2019-01-09 PROCEDURE — 83690 ASSAY OF LIPASE: CPT

## 2019-01-09 PROCEDURE — 85027 COMPLETE CBC AUTOMATED: CPT

## 2019-01-09 PROCEDURE — 82803 BLOOD GASES ANY COMBINATION: CPT

## 2019-01-09 PROCEDURE — 86900 BLOOD TYPING SEROLOGIC ABO: CPT

## 2019-01-09 PROCEDURE — 86850 RBC ANTIBODY SCREEN: CPT

## 2019-01-09 PROCEDURE — 86901 BLOOD TYPING SEROLOGIC RH(D): CPT

## 2019-01-09 PROCEDURE — 88304 TISSUE EXAM BY PATHOLOGIST: CPT

## 2019-01-09 PROCEDURE — 84132 ASSAY OF SERUM POTASSIUM: CPT

## 2019-01-09 PROCEDURE — 96375 TX/PRO/DX INJ NEW DRUG ADDON: CPT

## 2019-01-09 PROCEDURE — 96365 THER/PROPH/DIAG IV INF INIT: CPT

## 2019-01-09 PROCEDURE — 81001 URINALYSIS AUTO W/SCOPE: CPT

## 2019-01-09 PROCEDURE — 80053 COMPREHEN METABOLIC PANEL: CPT

## 2019-01-09 PROCEDURE — 84484 ASSAY OF TROPONIN QUANT: CPT

## 2019-03-20 ENCOUNTER — APPOINTMENT (OUTPATIENT)
Dept: CARDIOLOGY | Facility: CLINIC | Age: 59
End: 2019-03-20
Payer: MEDICAID

## 2019-03-20 ENCOUNTER — NON-APPOINTMENT (OUTPATIENT)
Age: 59
End: 2019-03-20

## 2019-03-20 VITALS
DIASTOLIC BLOOD PRESSURE: 81 MMHG | OXYGEN SATURATION: 97 % | BODY MASS INDEX: 31.99 KG/M2 | HEIGHT: 65 IN | WEIGHT: 192 LBS | HEART RATE: 81 BPM | SYSTOLIC BLOOD PRESSURE: 124 MMHG

## 2019-03-20 PROCEDURE — 99214 OFFICE O/P EST MOD 30 MIN: CPT

## 2019-03-20 PROCEDURE — 93000 ELECTROCARDIOGRAM COMPLETE: CPT

## 2019-03-20 RX ORDER — CLOPIDOGREL BISULFATE 75 MG/1
75 TABLET, FILM COATED ORAL
Refills: 0 | Status: DISCONTINUED | COMMUNITY
End: 2019-03-20

## 2019-03-20 RX ORDER — METOPROLOL SUCCINATE 25 MG/1
25 TABLET, EXTENDED RELEASE ORAL
Refills: 0 | Status: DISCONTINUED | COMMUNITY
End: 2019-03-20

## 2019-03-20 RX ORDER — LISINOPRIL 2.5 MG/1
2.5 TABLET ORAL
Refills: 0 | Status: DISCONTINUED | COMMUNITY
End: 2019-03-20

## 2019-03-20 NOTE — PHYSICAL EXAM
[General Appearance - Well Developed] : well developed [Normal Appearance] : normal appearance [General Appearance - Well Nourished] : well nourished [Well Groomed] : well groomed [No Deformities] : no deformities [General Appearance - In No Acute Distress] : no acute distress [Normal Conjunctiva] : the conjunctiva exhibited no abnormalities [Eyelids - No Xanthelasma] : the eyelids demonstrated no xanthelasmas [No Oral Pallor] : no oral pallor [Normal Oral Mucosa] : normal oral mucosa [No Oral Cyanosis] : no oral cyanosis [Normal Jugular Venous A Waves Present] : normal jugular venous A waves present [Normal Jugular Venous V Waves Present] : normal jugular venous V waves present [Respiration, Rhythm And Depth] : normal respiratory rhythm and effort [No Jugular Venous Ramsey A Waves] : no jugular venous ramsey A waves [Exaggerated Use Of Accessory Muscles For Inspiration] : no accessory muscle use [Heart Sounds] : normal S1 and S2 [Heart Rate And Rhythm] : heart rate and rhythm were normal [Auscultation Breath Sounds / Voice Sounds] : lungs were clear to auscultation bilaterally [Murmurs] : no murmurs present [Abdomen Tenderness] : non-tender [Abdomen Soft] : soft [Abdomen Mass (___ Cm)] : no abdominal mass palpated [Abnormal Walk] : normal gait [Gait - Sufficient For Exercise Testing] : the gait was sufficient for exercise testing [Nail Clubbing] : no clubbing of the fingernails [Cyanosis, Localized] : no localized cyanosis [Petechial Hemorrhages (___cm)] : no petechial hemorrhages [] : no ischemic changes

## 2019-03-20 NOTE — DISCUSSION/SUMMARY
[FreeTextEntry1] : 1.  cad-  s/p stent to the RCA.  Continue current rx.  Discussed weight loss with patient.

## 2019-03-20 NOTE — HISTORY OF PRESENT ILLNESS
[FreeTextEntry1] : He presented March 22, 2018 with a STEMI after shoveling snow.  He is s/p stent.  He had stress tests prior to the procedure and he states that it was fine.  He feels well now.  No chest pain.  Has not been exercising.  Has been trying to follow a diet.  No DARSHAN< PND or orthopnea.  He had a nathan.

## 2019-07-22 RX ORDER — ATORVASTATIN CALCIUM 80 MG/1
80 TABLET, FILM COATED ORAL
Qty: 90 | Refills: 3 | Status: ACTIVE | COMMUNITY
Start: 1900-01-01 | End: 1900-01-01

## 2019-11-21 NOTE — DISCHARGE NOTE ADULT - USE THE 5 A'S (ASK, ADVISE, ASSESS, ASSIST, ARRANGE)
Palliative Medicine Consult [facility]: [phone] [facility]: [phone] Time In: 1000 Time Out: 1015 Palliative Medicine Team ([address], RN, and this LCSW) met briefly with patient at bedside. Patient denied pain and SOB. She expressed that she was feeling \"a bit better. \" She was about to have dialysis and stated that her wish at this point in time is to continue with dialysis. No other family present. Patient has an AMD on file naming her children as her primary healthcare agents and her sister as her secondary healthcare agent (please see prior ACP note). Patient status remains DNR/DNI. Patient has a POST on file with the following measures: NO CPR; limited additional interventions (Other orders: Dialysis OK); NO feeding tube. PM Team to f/u as appropriate. Statement Selected

## 2020-02-11 NOTE — H&P ADULT - PSH
See [employer] messages.  Patient reports last dose of Lantus 2/6/20  SMBGs remain at goal thus far  Advised to continue close BG monitoring for time being, 1-2 times daily, vary between FBG and 2hpp-L or AC-S  Continue to hold Lantus   No significant past surgical history

## 2020-02-26 ENCOUNTER — NON-APPOINTMENT (OUTPATIENT)
Age: 60
End: 2020-02-26

## 2020-02-26 ENCOUNTER — APPOINTMENT (OUTPATIENT)
Dept: CARDIOLOGY | Facility: CLINIC | Age: 60
End: 2020-02-26
Payer: MEDICAID

## 2020-02-26 VITALS
DIASTOLIC BLOOD PRESSURE: 75 MMHG | OXYGEN SATURATION: 95 % | SYSTOLIC BLOOD PRESSURE: 114 MMHG | HEART RATE: 68 BPM | BODY MASS INDEX: 30.82 KG/M2 | WEIGHT: 185 LBS | HEIGHT: 65 IN

## 2020-02-26 PROCEDURE — 99214 OFFICE O/P EST MOD 30 MIN: CPT

## 2020-02-26 PROCEDURE — 93000 ELECTROCARDIOGRAM COMPLETE: CPT

## 2020-02-26 RX ORDER — METOPROLOL SUCCINATE 25 MG/1
25 TABLET, EXTENDED RELEASE ORAL DAILY
Qty: 90 | Refills: 3 | Status: ACTIVE | COMMUNITY
Start: 2020-02-26 | End: 1900-01-01

## 2020-02-26 RX ORDER — ALFUZOSIN HYDROCHLORIDE 10 MG/1
10 TABLET, EXTENDED RELEASE ORAL DAILY
Refills: 0 | Status: ACTIVE | COMMUNITY
Start: 2020-02-26

## 2020-02-26 RX ORDER — METOPROLOL TARTRATE 25 MG/1
25 TABLET, FILM COATED ORAL
Qty: 90 | Refills: 3 | Status: DISCONTINUED | COMMUNITY
Start: 1900-01-01 | End: 2020-02-26

## 2020-02-26 RX ORDER — LOSARTAN POTASSIUM 25 MG/1
25 TABLET, FILM COATED ORAL DAILY
Qty: 90 | Refills: 3 | Status: DISCONTINUED | COMMUNITY
Start: 1900-01-01 | End: 2020-02-26

## 2020-02-26 NOTE — PHYSICAL EXAM
[General Appearance - Well Developed] : well developed [Normal Appearance] : normal appearance [General Appearance - Well Nourished] : well nourished [Well Groomed] : well groomed [No Deformities] : no deformities [Eyelids - No Xanthelasma] : the eyelids demonstrated no xanthelasmas [General Appearance - In No Acute Distress] : no acute distress [Normal Conjunctiva] : the conjunctiva exhibited no abnormalities [No Oral Pallor] : no oral pallor [Normal Oral Mucosa] : normal oral mucosa [Normal Jugular Venous V Waves Present] : normal jugular venous V waves present [Normal Jugular Venous A Waves Present] : normal jugular venous A waves present [No Oral Cyanosis] : no oral cyanosis [No Jugular Venous Ramsey A Waves] : no jugular venous ramsey A waves [Respiration, Rhythm And Depth] : normal respiratory rhythm and effort [Heart Rate And Rhythm] : heart rate and rhythm were normal [Exaggerated Use Of Accessory Muscles For Inspiration] : no accessory muscle use [Auscultation Breath Sounds / Voice Sounds] : lungs were clear to auscultation bilaterally [Murmurs] : no murmurs present [Heart Sounds] : normal S1 and S2 [Abdomen Tenderness] : non-tender [Abdomen Soft] : soft [Abnormal Walk] : normal gait [Abdomen Mass (___ Cm)] : no abdominal mass palpated [Gait - Sufficient For Exercise Testing] : the gait was sufficient for exercise testing [Petechial Hemorrhages (___cm)] : no petechial hemorrhages [Nail Clubbing] : no clubbing of the fingernails [Cyanosis, Localized] : no localized cyanosis [] : no ischemic changes

## 2020-02-26 NOTE — HISTORY OF PRESENT ILLNESS
[FreeTextEntry1] : He presented March 22, 2018 with a STEMI after shoveling snow.  He is s/p stent.  He had stress tests prior to the procedure and he states that it was fine.  He feels well now.  No chest pain.  Has not been exercising.  Has been trying to follow a diet.  No DARSHAN< PND or orthopnea.

## 2020-10-13 NOTE — ED PROVIDER NOTE - MUSCULOSKELETAL, MLM
Spine appears normal, range of motion is not limited, no muscle or joint tenderness [General Appearance - Alert] : alert [General Appearance - In No Acute Distress] : in no acute distress [Sclera] : the sclera and conjunctiva were normal [PERRL With Normal Accommodation] : pupils were equal in size, round, and reactive to light [Extraocular Movements] : extraocular movements were intact [Outer Ear] : the ears and nose were normal in appearance [Oropharynx] : the oropharynx was normal [Neck Appearance] : the appearance of the neck was normal [Neck Cervical Mass (___cm)] : no neck mass was observed [Jugular Venous Distention Increased] : there was no jugular-venous distention [Thyroid Diffuse Enlargement] : the thyroid was not enlarged [Thyroid Nodule] : there were no palpable thyroid nodules [Auscultation Breath Sounds / Voice Sounds] : lungs were clear to auscultation bilaterally [Heart Rate And Rhythm] : heart rate was normal and rhythm regular [Heart Sounds] : normal S1 and S2 [Heart Sounds Gallop] : no gallops [Murmurs] : no murmurs [Heart Sounds Pericardial Friction Rub] : no pericardial rub [Bowel Sounds] : normal bowel sounds [Abdomen Soft] : soft [Abdomen Tenderness] : non-tender [] : no hepato-splenomegaly [Abdomen Mass (___ Cm)] : no abdominal mass palpated [Cervical Lymph Nodes Enlarged Posterior Bilaterally] : posterior cervical [Cervical Lymph Nodes Enlarged Anterior Bilaterally] : anterior cervical [Supraclavicular Lymph Nodes Enlarged Bilaterally] : supraclavicular [No CVA Tenderness] : no ~M costovertebral angle tenderness [No Spinal Tenderness] : no spinal tenderness [Abnormal Walk] : normal gait [Nail Clubbing] : no clubbing  or cyanosis of the fingernails [Musculoskeletal - Swelling] : no joint swelling seen [Motor Tone] : muscle strength and tone were normal [Deep Tendon Reflexes (DTR)] : deep tendon reflexes were 2+ and symmetric [Sensation] : the sensory exam was normal to light touch and pinprick [No Focal Deficits] : no focal deficits [Oriented To Time, Place, And Person] : oriented to person, place, and time [Impaired Insight] : insight and judgment were intact [Affect] : the affect was normal

## 2020-12-08 NOTE — CHART NOTE - NSCHARTNOTEFT_GEN_A_CORE
Removal of Radial Band    Pulses in the (right/left) upper extremity are (palpable/audible by doppler/absent). The patient was placed in the supine position. The insertion site was identified and the band deflated per protocol. The radial band was removed slowly. Direct pressure was applied for 0 minutes/not applicable.      Monitoring of the (right/left) wrists and both upper extremities including neuro-vascular checks and vital signs every 15 minutes x 4.    Complications: None    Comments: Peripheral pulses palpable. Ext warm and mobile. Cap refill <2 sec.

## 2021-03-24 ENCOUNTER — APPOINTMENT (OUTPATIENT)
Dept: CARDIOLOGY | Facility: CLINIC | Age: 61
End: 2021-03-24
Payer: MEDICAID

## 2021-03-24 ENCOUNTER — NON-APPOINTMENT (OUTPATIENT)
Age: 61
End: 2021-03-24

## 2021-03-24 VITALS
BODY MASS INDEX: 30.53 KG/M2 | OXYGEN SATURATION: 97 % | DIASTOLIC BLOOD PRESSURE: 73 MMHG | WEIGHT: 190 LBS | HEART RATE: 72 BPM | HEIGHT: 66 IN | SYSTOLIC BLOOD PRESSURE: 112 MMHG

## 2021-03-24 PROCEDURE — 99214 OFFICE O/P EST MOD 30 MIN: CPT

## 2021-03-24 PROCEDURE — 99072 ADDL SUPL MATRL&STAF TM PHE: CPT

## 2021-03-24 PROCEDURE — 93000 ELECTROCARDIOGRAM COMPLETE: CPT

## 2021-03-24 RX ORDER — AMLODIPINE BESYLATE 5 MG/1
5 TABLET ORAL DAILY
Qty: 90 | Refills: 3 | Status: DISCONTINUED | COMMUNITY
Start: 2020-02-26 | End: 2021-03-24

## 2021-03-24 NOTE — HISTORY OF PRESENT ILLNESS
[FreeTextEntry1] : He presented March 22, 2018 with a STEMI after shoveling snow.  He is s/p stent.  He had stress tests prior to the procedure and he states that it was fine.  He feels well now.  No chest pain.  Has not been exercising.  Has been trying to follow a diet.  No DARSHAN< PND or orthopnea.   Did not have issues with covid.

## 2021-03-24 NOTE — DISCUSSION/SUMMARY
[FreeTextEntry1] : 1.  cad-  s/p stent to the RCA.  Continue current rx.  Discussed weight loss with patient.\par \par 2.  htn -  stop amlodipine.

## 2021-03-24 NOTE — PHYSICAL EXAM
[General Appearance - Well Developed] : well developed [Normal Appearance] : normal appearance [Well Groomed] : well groomed [General Appearance - Well Nourished] : well nourished [No Deformities] : no deformities [General Appearance - In No Acute Distress] : no acute distress [Normal Conjunctiva] : the conjunctiva exhibited no abnormalities [Eyelids - No Xanthelasma] : the eyelids demonstrated no xanthelasmas [Normal Oral Mucosa] : normal oral mucosa [No Oral Pallor] : no oral pallor [No Oral Cyanosis] : no oral cyanosis [Normal Jugular Venous A Waves Present] : normal jugular venous A waves present [Normal Jugular Venous V Waves Present] : normal jugular venous V waves present [No Jugular Venous Ramsey A Waves] : no jugular venous ramsey A waves [Respiration, Rhythm And Depth] : normal respiratory rhythm and effort [Exaggerated Use Of Accessory Muscles For Inspiration] : no accessory muscle use [Auscultation Breath Sounds / Voice Sounds] : lungs were clear to auscultation bilaterally [Heart Rate And Rhythm] : heart rate and rhythm were normal [Heart Sounds] : normal S1 and S2 [Murmurs] : no murmurs present [Abdomen Soft] : soft [Abdomen Tenderness] : non-tender [Abdomen Mass (___ Cm)] : no abdominal mass palpated [Abnormal Walk] : normal gait [Gait - Sufficient For Exercise Testing] : the gait was sufficient for exercise testing [Nail Clubbing] : no clubbing of the fingernails [Cyanosis, Localized] : no localized cyanosis [Petechial Hemorrhages (___cm)] : no petechial hemorrhages [] : no ischemic changes

## 2022-04-07 NOTE — PRE-ANESTHESIA EVALUATION ADULT - BSA (M2)
Patient transferred to recovery cath lab slot 5 via stretcher with side rails up x2 .  Pt drowsy but able to follow commands. Pt is stable when connecting to cardiac monitors.  VSS. Right radial vasc band in place with 12ml of air in band c.d.i. no bleeding or hematoma noted. +2 trang radial pulses palpated. Dopplered trang pedal and post tib pulses.  Skin normal in color and warm to touch, <3 sec cap refill. Fall risk precautions given and patient acknowledges.  AIDET completed to pt.  Will continue to monitor patient.  Updated pt's spouse in sx waiting room.   1.91

## 2022-04-15 ENCOUNTER — EMERGENCY (EMERGENCY)
Facility: HOSPITAL | Age: 62
LOS: 1 days | Discharge: HOME | End: 2022-04-15
Attending: EMERGENCY MEDICINE
Payer: MEDICAID

## 2022-04-15 VITALS
SYSTOLIC BLOOD PRESSURE: 130 MMHG | HEART RATE: 68 BPM | TEMPERATURE: 98 F | DIASTOLIC BLOOD PRESSURE: 73 MMHG | OXYGEN SATURATION: 98 % | RESPIRATION RATE: 18 BRPM

## 2022-04-15 VITALS
HEART RATE: 70 BPM | SYSTOLIC BLOOD PRESSURE: 132 MMHG | DIASTOLIC BLOOD PRESSURE: 79 MMHG | RESPIRATION RATE: 18 BRPM | TEMPERATURE: 98 F | OXYGEN SATURATION: 99 % | HEIGHT: 65 IN | WEIGHT: 169.98 LBS

## 2022-04-15 DIAGNOSIS — Z90.49 ACQUIRED ABSENCE OF OTHER SPECIFIED PARTS OF DIGESTIVE TRACT: Chronic | ICD-10-CM

## 2022-04-15 LAB
ALBUMIN SERPL ELPH-MCNC: 3.5 G/DL — SIGNIFICANT CHANGE UP (ref 3.5–5)
ALP SERPL-CCNC: 72 U/L — SIGNIFICANT CHANGE UP (ref 40–120)
ALT FLD-CCNC: 38 U/L DA — SIGNIFICANT CHANGE UP (ref 10–60)
ANION GAP SERPL CALC-SCNC: 5 MMOL/L — SIGNIFICANT CHANGE UP (ref 5–17)
APPEARANCE UR: CLEAR — SIGNIFICANT CHANGE UP
AST SERPL-CCNC: 19 U/L — SIGNIFICANT CHANGE UP (ref 10–40)
BASOPHILS # BLD AUTO: 0.02 K/UL — SIGNIFICANT CHANGE UP (ref 0–0.2)
BASOPHILS NFR BLD AUTO: 0.2 % — SIGNIFICANT CHANGE UP (ref 0–2)
BILIRUB SERPL-MCNC: 0.5 MG/DL — SIGNIFICANT CHANGE UP (ref 0.2–1.2)
BILIRUB UR-MCNC: NEGATIVE — SIGNIFICANT CHANGE UP
BUN SERPL-MCNC: 15 MG/DL — SIGNIFICANT CHANGE UP (ref 7–18)
CALCIUM SERPL-MCNC: 9 MG/DL — SIGNIFICANT CHANGE UP (ref 8.4–10.5)
CHLORIDE SERPL-SCNC: 108 MMOL/L — SIGNIFICANT CHANGE UP (ref 96–108)
CO2 SERPL-SCNC: 24 MMOL/L — SIGNIFICANT CHANGE UP (ref 22–31)
COLOR SPEC: YELLOW — SIGNIFICANT CHANGE UP
CREAT SERPL-MCNC: 0.73 MG/DL — SIGNIFICANT CHANGE UP (ref 0.5–1.3)
DIFF PNL FLD: NEGATIVE — SIGNIFICANT CHANGE UP
EGFR: 104 ML/MIN/1.73M2 — SIGNIFICANT CHANGE UP
EOSINOPHIL # BLD AUTO: 0.05 K/UL — SIGNIFICANT CHANGE UP (ref 0–0.5)
EOSINOPHIL NFR BLD AUTO: 0.6 % — SIGNIFICANT CHANGE UP (ref 0–6)
GLUCOSE SERPL-MCNC: 97 MG/DL — SIGNIFICANT CHANGE UP (ref 70–99)
GLUCOSE UR QL: NEGATIVE — SIGNIFICANT CHANGE UP
HCT VFR BLD CALC: 41.6 % — SIGNIFICANT CHANGE UP (ref 39–50)
HGB BLD-MCNC: 14 G/DL — SIGNIFICANT CHANGE UP (ref 13–17)
HIV 1 & 2 AB SERPL IA.RAPID: SIGNIFICANT CHANGE UP
IMM GRANULOCYTES NFR BLD AUTO: 0.4 % — SIGNIFICANT CHANGE UP (ref 0–1.5)
KETONES UR-MCNC: NEGATIVE — SIGNIFICANT CHANGE UP
LEUKOCYTE ESTERASE UR-ACNC: NEGATIVE — SIGNIFICANT CHANGE UP
LIDOCAIN IGE QN: 67 U/L — LOW (ref 73–393)
LYMPHOCYTES # BLD AUTO: 1.51 K/UL — SIGNIFICANT CHANGE UP (ref 1–3.3)
LYMPHOCYTES # BLD AUTO: 17.6 % — SIGNIFICANT CHANGE UP (ref 13–44)
MCHC RBC-ENTMCNC: 27.5 PG — SIGNIFICANT CHANGE UP (ref 27–34)
MCHC RBC-ENTMCNC: 33.7 GM/DL — SIGNIFICANT CHANGE UP (ref 32–36)
MCV RBC AUTO: 81.7 FL — SIGNIFICANT CHANGE UP (ref 80–100)
MONOCYTES # BLD AUTO: 0.49 K/UL — SIGNIFICANT CHANGE UP (ref 0–0.9)
MONOCYTES NFR BLD AUTO: 5.7 % — SIGNIFICANT CHANGE UP (ref 2–14)
NEUTROPHILS # BLD AUTO: 6.46 K/UL — SIGNIFICANT CHANGE UP (ref 1.8–7.4)
NEUTROPHILS NFR BLD AUTO: 75.5 % — SIGNIFICANT CHANGE UP (ref 43–77)
NITRITE UR-MCNC: NEGATIVE — SIGNIFICANT CHANGE UP
NRBC # BLD: 0 /100 WBCS — SIGNIFICANT CHANGE UP (ref 0–0)
PH UR: 5 — SIGNIFICANT CHANGE UP (ref 5–8)
PLATELET # BLD AUTO: 222 K/UL — SIGNIFICANT CHANGE UP (ref 150–400)
POTASSIUM SERPL-MCNC: 4 MMOL/L — SIGNIFICANT CHANGE UP (ref 3.5–5.3)
POTASSIUM SERPL-SCNC: 4 MMOL/L — SIGNIFICANT CHANGE UP (ref 3.5–5.3)
PROT SERPL-MCNC: 7.5 G/DL — SIGNIFICANT CHANGE UP (ref 6–8.3)
PROT UR-MCNC: NEGATIVE — SIGNIFICANT CHANGE UP
RBC # BLD: 5.09 M/UL — SIGNIFICANT CHANGE UP (ref 4.2–5.8)
RBC # FLD: 12.5 % — SIGNIFICANT CHANGE UP (ref 10.3–14.5)
SARS-COV-2 RNA SPEC QL NAA+PROBE: SIGNIFICANT CHANGE UP
SODIUM SERPL-SCNC: 137 MMOL/L — SIGNIFICANT CHANGE UP (ref 135–145)
SP GR SPEC: 1.02 — SIGNIFICANT CHANGE UP (ref 1.01–1.02)
TROPONIN I, HIGH SENSITIVITY RESULT: 7.3 NG/L — SIGNIFICANT CHANGE UP
UROBILINOGEN FLD QL: NEGATIVE — SIGNIFICANT CHANGE UP
WBC # BLD: 8.56 K/UL — SIGNIFICANT CHANGE UP (ref 3.8–10.5)
WBC # FLD AUTO: 8.56 K/UL — SIGNIFICANT CHANGE UP (ref 3.8–10.5)

## 2022-04-15 PROCEDURE — 85025 COMPLETE CBC W/AUTO DIFF WBC: CPT

## 2022-04-15 PROCEDURE — 36415 COLL VENOUS BLD VENIPUNCTURE: CPT

## 2022-04-15 PROCEDURE — 83690 ASSAY OF LIPASE: CPT

## 2022-04-15 PROCEDURE — 74177 CT ABD & PELVIS W/CONTRAST: CPT | Mod: MA

## 2022-04-15 PROCEDURE — 96375 TX/PRO/DX INJ NEW DRUG ADDON: CPT

## 2022-04-15 PROCEDURE — 80053 COMPREHEN METABOLIC PANEL: CPT

## 2022-04-15 PROCEDURE — 99285 EMERGENCY DEPT VISIT HI MDM: CPT

## 2022-04-15 PROCEDURE — 93005 ELECTROCARDIOGRAM TRACING: CPT

## 2022-04-15 PROCEDURE — 84484 ASSAY OF TROPONIN QUANT: CPT

## 2022-04-15 PROCEDURE — 87635 SARS-COV-2 COVID-19 AMP PRB: CPT

## 2022-04-15 PROCEDURE — 99285 EMERGENCY DEPT VISIT HI MDM: CPT | Mod: 25

## 2022-04-15 PROCEDURE — 81003 URINALYSIS AUTO W/O SCOPE: CPT

## 2022-04-15 PROCEDURE — 74177 CT ABD & PELVIS W/CONTRAST: CPT | Mod: 26,MA

## 2022-04-15 PROCEDURE — 87086 URINE CULTURE/COLONY COUNT: CPT

## 2022-04-15 PROCEDURE — 86703 HIV-1/HIV-2 1 RESULT ANTBDY: CPT

## 2022-04-15 PROCEDURE — 93010 ELECTROCARDIOGRAM REPORT: CPT

## 2022-04-15 PROCEDURE — 96374 THER/PROPH/DIAG INJ IV PUSH: CPT | Mod: XU

## 2022-04-15 RX ORDER — SODIUM CHLORIDE 9 MG/ML
1000 INJECTION INTRAMUSCULAR; INTRAVENOUS; SUBCUTANEOUS
Refills: 0 | Status: DISCONTINUED | OUTPATIENT
Start: 2022-04-15 | End: 2022-04-19

## 2022-04-15 RX ORDER — KETOROLAC TROMETHAMINE 30 MG/ML
30 SYRINGE (ML) INJECTION ONCE
Refills: 0 | Status: DISCONTINUED | OUTPATIENT
Start: 2022-04-15 | End: 2022-04-15

## 2022-04-15 RX ORDER — FAMOTIDINE 10 MG/ML
1 INJECTION INTRAVENOUS
Qty: 60 | Refills: 0
Start: 2022-04-15 | End: 2022-05-14

## 2022-04-15 RX ORDER — FAMOTIDINE 10 MG/ML
20 INJECTION INTRAVENOUS ONCE
Refills: 0 | Status: COMPLETED | OUTPATIENT
Start: 2022-04-15 | End: 2022-04-15

## 2022-04-15 RX ADMIN — Medication 30 MILLIGRAM(S): at 17:57

## 2022-04-15 RX ADMIN — Medication 30 MILLIGRAM(S): at 18:22

## 2022-04-15 RX ADMIN — FAMOTIDINE 20 MILLIGRAM(S): 10 INJECTION INTRAVENOUS at 17:57

## 2022-04-15 RX ADMIN — SODIUM CHLORIDE 125 MILLILITER(S): 9 INJECTION INTRAMUSCULAR; INTRAVENOUS; SUBCUTANEOUS at 17:57

## 2022-04-15 NOTE — ED PROVIDER NOTE - PATIENT PORTAL LINK FT
You can access the FollowMyHealth Patient Portal offered by [facility] by registering at the following website: [email or web address]. By joining [employer]’s FollowMyHealth portal, you will also be able to view your health information using other applications (apps) compatible with our system.

## 2022-04-15 NOTE — ED ADULT NURSE NOTE - NSIMPLEMENTINTERV_GEN_ALL_ED
Implemented All Fall with Harm Risk Interventions:  [place] to call system. Call bell, personal items and telephone within reach. Instruct patient to call for assistance. Room bathroom lighting operational. Non-slip footwear when patient is off stretcher. Physically safe environment: no spills, clutter or unnecessary equipment. Stretcher in lowest position, wheels locked, appropriate side rails in place. Provide visual cue, wrist band, yellow gown, etc. Monitor gait and stability. Monitor for mental status changes and reorient to person, place, and time. Review medications for side effects contributing to fall risk. Reinforce activity limits and safety measures with patient and family. Provide visual clues: red socks. Implemented All Universal Safety Interventions:  [place] to call system. Call bell, personal items and telephone within reach. Instruct patient to call for assistance. Room bathroom lighting operational. Non-slip footwear when patient is off stretcher. Physically safe environment: no spills, clutter or unnecessary equipment. Stretcher in lowest position, wheels locked, appropriate side rails in place.

## 2022-04-15 NOTE — ED PROVIDER NOTE - CPE EDP SKIN NORM
Antonietta, if you haven't already advised appt, please do so. I will need a record release for psych so it may be best for pt to be seen in office as opposed to virtually. I have gone through her med list and removed the meds she stated were d/c'd by psych.
From: Jamie Martines  To: GloriaMAEVE Farmer  Sent: 10/23/2020 4:45 PM CDT  Subject: Prescription Question    My psychiatrist won't prescribe me any antidepressants while I'm on antibiotics or Xanax. Here is the problem. Over the past 8 or 9 months she has had me discontinue every medication I took that can affect the mood. Some of the meds were prescribed for my back problems as an anti-inflammatory or for my insomnia. I have quit using Dulexitine, Depakote, Seroquel and Xanax. She had me off all those meds and hasn't replaced them with anything because I've been on antibiotics most of the past six months. The last time I was in the hospital, i was put back on Xanax to help settle me down. I am terribly depressed. I have spoken with my therapist and a couple of specialists through my insurance. All believe I should be on a antidepressant now. I have been advised to see you about putting me on one since you are my primary care giver. I will be terminating my association with my current psychiatrist. I feel as if I'm walking on a tightrope with no net under me. I have q problem with   depression in my normal life. And my life hasn't been normal since this spider bite ordeal began. These past six months would put anyone into a depression. The psych I've been seeing won't make any concessions for my circumstances. Even my therapist has told me to come to you. I don't know what else to do. I need help and I dont know where to turn. You may contact my therapist if you like. Her name is Yuriy Schmidt. I've been seeing her for 2 and a half years. Right now I see her weekly because of how deep my depression has gotten during the past few months.
normal...

## 2022-04-15 NOTE — ED PROVIDER NOTE - CARDIOVASCULAR NEGATIVE STATEMENT, MLM
[facility]NCOUNTER  INTERNAL MEDICINE OFFICE VISIT      CHIEF COMPLAINT:    Micah Stone is a 64 year old male who presents with C/O Medication Management    BP elevation   Up three pounds . No headaches.or edema. 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     Hypothyroid   Feels well. No constipation .        Patient Active Problem List   Diagnosis   • Benign prostatic hyperplasia   • Genital herpes simplex   • History of thyroid nodule   • Hx of adenomatous polyp of colon   • Postablative hypothyroidism   • Overweight   • DENVER on CPAP   • Lumbar disc herniation   • ED (erectile dysfunction)       Past Medical History:   Diagnosis Date   • ED (erectile dysfunction) 2/2/2021   • Lumbar disc herniation 2/2/2021   • DENVER on CPAP 2/2/2021 2011 sleep study        Family History   Problem Relation Age of Onset   • Cancer Father    • Cancer Paternal Aunt        Current Outpatient Medications   Medication Sig   • finasteride (PROSCAR) 5 MG tablet TAKE ONE TABLET BY MOUTH DAILY   • levothyroxine 150 MCG tablet Take 1 tablet by mouth daily.   • acyclovir (ZOVIRAX) 400 MG tablet Take 1 tablet by mouth 2 times daily.   • omeprazole 20 MG tablet Take 1 tablet by mouth daily.   • tamsulosin (FLOMAX) 0.4 MG Cap Take 1 capsule by mouth daily.     No current facility-administered medications for this visit.       ALLERGIES:  No Known Allergies      PAST HISTORIES:  I have reviewed the patient's medications and allergies, past medical, surgical, social and family history, updating these as appropriate.  See Histories section of the electronic medical record for a display of this information.      REVIEW OF SYSTEMS:       Review of systems:  Constitutional: No weight changes or significant fatigue. Denies significant night sweats or unusual bruising. No fever or chills      Respiratory: No shortness of breath. Denies wheezing, cough  Cardiovascular: No chest pain or palpitations. Denies pnd or  orthopnea  Gastrointestinal: No abdominal pain ,no nausea  , no vomiting, No frequent heartburn, no constipation or diarrhea. No melena or hematochezia. No dysphagia  Skin: No new rashes or changes in  moles.  Extremities: No pain or significant swelling  Neurologic : No focal weakness or numbness , no headaches or dizziness or loss of consciousness .  Musculoskeletal: No significant joint pain or swelling .   Psychiatry: No symptoms of depression or anxiety, no changes in sleep pattern.      PHYSICAL EXAM:    Vital Signs:    Vitals:    12/08/21 1216   BP: 138/90   Pulse: 68   Resp: 14   Temp: 97.2 °F (36.2 °C)   Weight: 96.9 kg (213 lb 9.6 oz)   Height: 6' (1.829 m)        Wt Readings from Last 5 Encounters:   12/08/21 96.9 kg (213 lb 9.6 oz)   02/01/21 95.3 kg (210 lb 1.6 oz)     BP Readings from Last 4 Encounters:   12/08/21 138/90   02/01/21 138/82         General: Well developed. Well nourished. In no apparent distress.     Respiratory: Normal respiratory effort. Symmetrical chest expansion. . No chest wall tenderness. Lungs clear to auscultation bilaterally. No wheezes. No rhonchi. No rales. No rubs.  Cardiovascular: PMI (point of maximum impulse) nondisplaced. . Regular rate and rhythm. No murmurs, rubs, or gallops. Normal S1 and S2. No S3 or S4. No JVD (jugular vein distention).  No peripheral edema.   Abdominal:  +bowel sounds.  No costovertebral angle tenderness.  No hepatosplenomegaly.  No distention and no tenderness in either the upper or lower abdomen.  There is no rebound or guarding.  No masses or hernias are present.   Neurologic: Alert and oriented x 3. Gait is normal. I Psychiatric: Cooperative. Appropriate mood and affect. Normal judgment. Recent and remote memory intact.     LAB RESULTS:  Pertinent labs reviewed.      ASSESSMENT:   No diagnosis found.      PLAN:     Primary hypertension   -Will start lisinopril 10mg   _Reviewed side effects including angioedema  -Lifestyle modification  advised  -Home monitoring advised     Post ablative hypothyroidism  -Clinically euthyroid and chemically euthyroid as of 12/6/21  -Continue levothyroxine 150mcg        Hx of HSV2  -On suppressive therapy   -Cont Valtrex     Hx mild hyperlipidemia  -Has not been on statin   -LDL goal is <100 , he was at 100        BPH  -On flomax and finasteride  -Update PSA       GERD  -No warning sx  -No hx of EGD  -On ppi     DENVER   -On CPAP     Will get COVID booster     No orders of the defined types were placed in this encounter.      No follow-ups on file.    Pt voiced understanding and agreement with the plan of care.  Provider wore mask,  throughout entire visit.      no chest pain and no edema.

## 2022-04-15 NOTE — ED PROVIDER NOTE - OBJECTIVE STATEMENT
62 y.o. male c/o constant RUQ pain for past 7 days @ times radiated to epigastric area, no fever, coughing, n/v, dysuria, hematuria, last BM this am, no BRBPR, pt's fasting 2/2 [employer] reason for past 15 days.  Pt went to [facility] 2 days ago, CXR-NAD.  Pain worse with movement & placing seatbelt, pt took nothing for pain. 62 y.o. male c/o constant RUQ pain for past 7 days @ times radiated to epigastric area, no fever, coughing, n/v, sob, dysuria, hematuria, last BM this am, no BRBPR, pt's fasting 2/2 [employer] reason for past 15 days.  Pt went to [facility] clinic 2 days ago, CXR-NAD.  Pain worse with movement & placing seatbelt, pt took nothing for pain.  Pt's vaccinated for COVID.  Never had EGD/colonoscopy

## 2022-04-15 NOTE — ED ADULT NURSE NOTE - OBJECTIVE STATEMENT
pt is here for abdominal pain.  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  pt stated that c/o upper abdominal pain x 1 week, denied N/V/D or fever,

## 2022-04-15 NOTE — ED PROVIDER NOTE - CLINICAL SUMMARY MEDICAL DECISION MAKING FREE TEXT BOX
Rt sided abd pain, h/o cholecystectomy, concern for muscular pain, vs abd pathology, will get labs, CT, reassess

## 2022-04-15 NOTE — ED PROVIDER NOTE - NSICDXPASTMEDICALHX_GEN_ALL_CORE_FT
PAST MEDICAL HISTORY:  CAD (coronary artery disease)     HTN (hypertension)     Hyperlipidemia, unspecified hyperlipidemia type     Stented coronary artery

## 2022-04-16 LAB
CULTURE RESULTS: SIGNIFICANT CHANGE UP
SPECIMEN SOURCE: SIGNIFICANT CHANGE UP

## 2022-05-18 ENCOUNTER — APPOINTMENT (OUTPATIENT)
Dept: CARDIOLOGY | Facility: CLINIC | Age: 62
End: 2022-05-18
Payer: MEDICAID

## 2022-05-18 ENCOUNTER — NON-APPOINTMENT (OUTPATIENT)
Age: 62
End: 2022-05-18

## 2022-05-18 VITALS
OXYGEN SATURATION: 97 % | WEIGHT: 190 LBS | HEART RATE: 55 BPM | BODY MASS INDEX: 30.53 KG/M2 | HEIGHT: 66 IN | DIASTOLIC BLOOD PRESSURE: 79 MMHG | SYSTOLIC BLOOD PRESSURE: 133 MMHG

## 2022-05-18 PROCEDURE — 99214 OFFICE O/P EST MOD 30 MIN: CPT | Mod: 25

## 2022-05-18 PROCEDURE — 93000 ELECTROCARDIOGRAM COMPLETE: CPT

## 2022-05-18 NOTE — HISTORY OF PRESENT ILLNESS
[FreeTextEntry1] : He presented March 22, 2018 with a STEMI after shoveling snow.  He is s/p stent.  He had stress tests prior to the procedure and he states that it was fine.  He feels well now.  No chest pain.  Has not been exercising.  Has been trying to follow a diet.  No DARSHAN< PND or orthopnea.   Did not have issues with covid.  He is scheduled for a upper endoscopy.  He can walk a mile without difficulty.

## 2022-05-18 NOTE — DISCUSSION/SUMMARY
[FreeTextEntry1] : 1.  cad-  s/p stent to the RCA.  Continue current rx.  Patient is at acceptable risk to proceed with his upper endoscopy.\par \par

## 2022-05-18 NOTE — PHYSICAL EXAM
[General Appearance - Well Developed] : well developed [Normal Appearance] : normal appearance [Well Groomed] : well groomed [General Appearance - Well Nourished] : well nourished [No Deformities] : no deformities [General Appearance - In No Acute Distress] : no acute distress [Normal Conjunctiva] : the conjunctiva exhibited no abnormalities [Eyelids - No Xanthelasma] : the eyelids demonstrated no xanthelasmas [Normal Oral Mucosa] : normal oral mucosa [No Oral Pallor] : no oral pallor [No Oral Cyanosis] : no oral cyanosis [Normal Jugular Venous A Waves Present] : normal jugular venous A waves present [Normal Jugular Venous V Waves Present] : normal jugular venous V waves present [No Jugular Venous Ramsey A Waves] : no jugular venous ramsey A waves [Exaggerated Use Of Accessory Muscles For Inspiration] : no accessory muscle use [Respiration, Rhythm And Depth] : normal respiratory rhythm and effort [Auscultation Breath Sounds / Voice Sounds] : lungs were clear to auscultation bilaterally [Heart Sounds] : normal S1 and S2 [Heart Rate And Rhythm] : heart rate and rhythm were normal [Murmurs] : no murmurs present [Abdomen Soft] : soft [Abdomen Tenderness] : non-tender [Abdomen Mass (___ Cm)] : no abdominal mass palpated [Abnormal Walk] : normal gait [Gait - Sufficient For Exercise Testing] : the gait was sufficient for exercise testing [Nail Clubbing] : no clubbing of the fingernails [Cyanosis, Localized] : no localized cyanosis [Petechial Hemorrhages (___cm)] : no petechial hemorrhages [] : no ischemic changes

## 2022-06-10 NOTE — ED ADULT TRIAGE NOTE - CCCP TRG CHIEF CMPLNT
Awoke this morning with discharge from left eye  Just began today  Has had a runny nose with congestion  Afebrile  Denies any other symptoms currently  Clean with warm water  Observe  Disc s/s warranting cb  Made aware of how to reach [facility] if needed  To call as needed 
Possible pink eye in left eye 
abdominal pain

## 2023-01-07 PROBLEM — I10 ESSENTIAL (PRIMARY) HYPERTENSION: Chronic | Status: ACTIVE | Noted: 2022-04-15

## 2023-03-07 ENCOUNTER — APPOINTMENT (OUTPATIENT)
Dept: CARDIOLOGY | Facility: CLINIC | Age: 63
End: 2023-03-07
Payer: MEDICAID

## 2023-03-07 ENCOUNTER — NON-APPOINTMENT (OUTPATIENT)
Age: 63
End: 2023-03-07

## 2023-03-07 VITALS
OXYGEN SATURATION: 97 % | DIASTOLIC BLOOD PRESSURE: 81 MMHG | BODY MASS INDEX: 31.66 KG/M2 | WEIGHT: 197 LBS | HEART RATE: 77 BPM | HEIGHT: 66 IN | SYSTOLIC BLOOD PRESSURE: 137 MMHG

## 2023-03-07 PROCEDURE — 93000 ELECTROCARDIOGRAM COMPLETE: CPT

## 2023-03-07 PROCEDURE — 99214 OFFICE O/P EST MOD 30 MIN: CPT | Mod: 25

## 2023-03-07 NOTE — PHYSICAL EXAM
[General Appearance - Well Developed] : well developed [Normal Appearance] : normal appearance [Well Groomed] : well groomed [No Deformities] : no deformities [General Appearance - Well Nourished] : well nourished [General Appearance - In No Acute Distress] : no acute distress [Normal Conjunctiva] : the conjunctiva exhibited no abnormalities [Eyelids - No Xanthelasma] : the eyelids demonstrated no xanthelasmas [Normal Oral Mucosa] : normal oral mucosa [No Oral Pallor] : no oral pallor [No Oral Cyanosis] : no oral cyanosis [Normal Jugular Venous A Waves Present] : normal jugular venous A waves present [Normal Jugular Venous V Waves Present] : normal jugular venous V waves present [No Jugular Venous Ramsey A Waves] : no jugular venous ramsey A waves [Respiration, Rhythm And Depth] : normal respiratory rhythm and effort [Exaggerated Use Of Accessory Muscles For Inspiration] : no accessory muscle use [Auscultation Breath Sounds / Voice Sounds] : lungs were clear to auscultation bilaterally [Heart Rate And Rhythm] : heart rate and rhythm were normal [Heart Sounds] : normal S1 and S2 [Murmurs] : no murmurs present [Abdomen Soft] : soft [Abdomen Tenderness] : non-tender [Abdomen Mass (___ Cm)] : no abdominal mass palpated [Abnormal Walk] : normal gait [Gait - Sufficient For Exercise Testing] : the gait was sufficient for exercise testing [Nail Clubbing] : no clubbing of the fingernails [Cyanosis, Localized] : no localized cyanosis [Petechial Hemorrhages (___cm)] : no petechial hemorrhages [] : no ischemic changes

## 2023-03-07 NOTE — HISTORY OF PRESENT ILLNESS
[FreeTextEntry1] : He presented March 22, 2018 with a STEMI after shoveling snow.  He is s/p stent.  He had stress tests prior to the procedure and he states that it was fine.  He feels well now.  No chest pain.  Has not been exercising.  Has been trying to follow a diet.  No DARSHAN< PND or orthopnea.   Did not have issues with covid.    He can walk a mile without difficulty.  His main issues are muscle aches.  which have occurred over the past 4 months and improved with stopping atorvastatin

## 2023-03-07 NOTE — DISCUSSION/SUMMARY
[FreeTextEntry1] : 1.  cad-  s/p stent to the RCA.  Continue current rx.   \par \par 2.  hyperlipidemia -  will start pravachol.

## 2023-03-13 LAB
ALBUMIN SERPL ELPH-MCNC: 4.7 G/DL
ALP BLD-CCNC: 60 U/L
ALT SERPL-CCNC: 47 U/L
ANION GAP SERPL CALC-SCNC: 11 MMOL/L
AST SERPL-CCNC: 27 U/L
BILIRUB SERPL-MCNC: 0.3 MG/DL
BUN SERPL-MCNC: 11 MG/DL
CALCIUM SERPL-MCNC: 9.6 MG/DL
CHLORIDE SERPL-SCNC: 103 MMOL/L
CHOLEST SERPL-MCNC: 148 MG/DL
CK SERPL-CCNC: 90 U/L
CO2 SERPL-SCNC: 27 MMOL/L
CREAT SERPL-MCNC: 0.82 MG/DL
EGFR: 100 ML/MIN/1.73M2
GLUCOSE SERPL-MCNC: 107 MG/DL
HDLC SERPL-MCNC: 39 MG/DL
LDLC SERPL CALC-MCNC: 62 MG/DL
NONHDLC SERPL-MCNC: 109 MG/DL
POTASSIUM SERPL-SCNC: 4.4 MMOL/L
PROT SERPL-MCNC: 7 G/DL
SODIUM SERPL-SCNC: 141 MMOL/L
TRIGL SERPL-MCNC: 235 MG/DL

## 2023-03-14 NOTE — ED ADULT NURSE NOTE - CAS EDP DISCH TYPE
Patient has multiple medical comorbidities that have caused her to cancel surgery twice now with Dr. Hernandez.  I spoke with the patient on the phone and informed her that she will need to obtain medical clearance for surgery with her PMD and her pulmonologist prior to rescheduling surgery.  She has appointments scheduled with these physicians in April and May and will discuss this with them.  She also expressed interest today in having surgery in [facility].  She will call us after her appointment with Dr. Oreilly in May and let us know if she is medically cleared for surgery.  
This writer phoned the patient to see how she was doing after being admitted to the hospital with low O2 sats last week which resulted in her first surgery being canceled.    Patient states that she is doing good and that she followed up after her hospital stay with an associate Ifrah Rodriguez DO of her PCP Arnol Oreilly MD. She also had a appointment with her Endocrinologist Dr. Hill.  When asked by this writer if she made either one of them aware that she was scheduled for surgery on 3/7/23 she said that she had not.    Patient today states that she can't have surgery at [place] due to location.  She wants to have surgery I  \"in [place].\"      Forwarded to Dr. Hernandez and his clinical team to determine the next step(s) in getting the patient scheduled for surgery at a [place] facility.      
Home

## 2023-04-18 NOTE — DISCHARGE NOTE ADULT - NS AS ACTIVITY OBS
Conjunctivae and eyelids appear normal,  Sclerae : White without injection 
No Heavy lifting/straining/Showering allowed

## 2023-05-08 ENCOUNTER — APPOINTMENT (OUTPATIENT)
Dept: SURGERY | Facility: CLINIC | Age: 63
End: 2023-05-08

## 2023-06-30 NOTE — PATIENT PROFILE ADULT. - ANESTHESIA, PREVIOUS REACTION, PROFILE
regular rate and rhythm; no edema Eloped (saw a physician/midlevel provider but left without telling anyone) none

## 2023-11-24 ENCOUNTER — RX RENEWAL (OUTPATIENT)
Age: 63
End: 2023-11-24

## 2024-02-10 ENCOUNTER — NON-APPOINTMENT (OUTPATIENT)
Age: 64
End: 2024-02-10

## 2024-02-10 ENCOUNTER — TRANSCRIPTION ENCOUNTER (OUTPATIENT)
Age: 64
End: 2024-02-10

## 2024-02-10 ENCOUNTER — INPATIENT (INPATIENT)
Facility: HOSPITAL | Age: 64
LOS: 0 days | Discharge: HOME | DRG: 303 | End: 2024-02-10
Attending: HOSPITALIST | Admitting: HOSPITALIST
Payer: MEDICAID

## 2024-02-10 VITALS
SYSTOLIC BLOOD PRESSURE: 115 MMHG | RESPIRATION RATE: 16 BRPM | TEMPERATURE: 98 F | OXYGEN SATURATION: 95 % | DIASTOLIC BLOOD PRESSURE: 74 MMHG | HEART RATE: 69 BPM

## 2024-02-10 VITALS
HEART RATE: 97 BPM | WEIGHT: 169.98 LBS | HEIGHT: 65 IN | TEMPERATURE: 98 F | DIASTOLIC BLOOD PRESSURE: 83 MMHG | OXYGEN SATURATION: 97 % | RESPIRATION RATE: 18 BRPM | SYSTOLIC BLOOD PRESSURE: 131 MMHG

## 2024-02-10 DIAGNOSIS — Z29.9 ENCOUNTER FOR PROPHYLACTIC MEASURES, UNSPECIFIED: ICD-10-CM

## 2024-02-10 DIAGNOSIS — I20.0 UNSTABLE ANGINA: ICD-10-CM

## 2024-02-10 DIAGNOSIS — R07.9 CHEST PAIN, UNSPECIFIED: ICD-10-CM

## 2024-02-10 DIAGNOSIS — I10 ESSENTIAL (PRIMARY) HYPERTENSION: ICD-10-CM

## 2024-02-10 DIAGNOSIS — I25.10 ATHEROSCLEROTIC HEART DISEASE OF NATIVE CORONARY ARTERY WITHOUT ANGINA PECTORIS: ICD-10-CM

## 2024-02-10 DIAGNOSIS — Z90.49 ACQUIRED ABSENCE OF OTHER SPECIFIED PARTS OF DIGESTIVE TRACT: Chronic | ICD-10-CM

## 2024-02-10 DIAGNOSIS — E78.5 HYPERLIPIDEMIA, UNSPECIFIED: ICD-10-CM

## 2024-02-10 LAB
ALBUMIN SERPL ELPH-MCNC: 4.3 G/DL — SIGNIFICANT CHANGE UP (ref 3.3–5)
ALP SERPL-CCNC: 50 U/L — SIGNIFICANT CHANGE UP (ref 40–120)
ALT FLD-CCNC: 45 U/L — SIGNIFICANT CHANGE UP (ref 10–45)
ANION GAP SERPL CALC-SCNC: 12 MMOL/L — SIGNIFICANT CHANGE UP (ref 5–17)
AST SERPL-CCNC: 21 U/L — SIGNIFICANT CHANGE UP (ref 10–40)
BASOPHILS # BLD AUTO: 0.03 K/UL — SIGNIFICANT CHANGE UP (ref 0–0.2)
BASOPHILS NFR BLD AUTO: 0.7 % — SIGNIFICANT CHANGE UP (ref 0–2)
BILIRUB SERPL-MCNC: 0.3 MG/DL — SIGNIFICANT CHANGE UP (ref 0.2–1.2)
BUN SERPL-MCNC: 12 MG/DL — SIGNIFICANT CHANGE UP (ref 7–23)
CALCIUM SERPL-MCNC: 9.3 MG/DL — SIGNIFICANT CHANGE UP (ref 8.4–10.5)
CHLORIDE SERPL-SCNC: 103 MMOL/L — SIGNIFICANT CHANGE UP (ref 96–108)
CO2 SERPL-SCNC: 27 MMOL/L — SIGNIFICANT CHANGE UP (ref 22–31)
CREAT SERPL-MCNC: 0.82 MG/DL — SIGNIFICANT CHANGE UP (ref 0.5–1.3)
EGFR: 99 ML/MIN/1.73M2 — SIGNIFICANT CHANGE UP
EOSINOPHIL # BLD AUTO: 0.27 K/UL — SIGNIFICANT CHANGE UP (ref 0–0.5)
EOSINOPHIL NFR BLD AUTO: 5.9 % — SIGNIFICANT CHANGE UP (ref 0–6)
GLUCOSE SERPL-MCNC: 146 MG/DL — HIGH (ref 70–99)
HCT VFR BLD CALC: 43.1 % — SIGNIFICANT CHANGE UP (ref 39–50)
HGB BLD-MCNC: 14.7 G/DL — SIGNIFICANT CHANGE UP (ref 13–17)
IMM GRANULOCYTES NFR BLD AUTO: 0.4 % — SIGNIFICANT CHANGE UP (ref 0–0.9)
LYMPHOCYTES # BLD AUTO: 1.72 K/UL — SIGNIFICANT CHANGE UP (ref 1–3.3)
LYMPHOCYTES # BLD AUTO: 37.7 % — SIGNIFICANT CHANGE UP (ref 13–44)
MAGNESIUM SERPL-MCNC: 2.1 MG/DL — SIGNIFICANT CHANGE UP (ref 1.6–2.6)
MCHC RBC-ENTMCNC: 27.6 PG — SIGNIFICANT CHANGE UP (ref 27–34)
MCHC RBC-ENTMCNC: 34.1 GM/DL — SIGNIFICANT CHANGE UP (ref 32–36)
MCV RBC AUTO: 81 FL — SIGNIFICANT CHANGE UP (ref 80–100)
MONOCYTES # BLD AUTO: 0.34 K/UL — SIGNIFICANT CHANGE UP (ref 0–0.9)
MONOCYTES NFR BLD AUTO: 7.5 % — SIGNIFICANT CHANGE UP (ref 2–14)
NEUTROPHILS # BLD AUTO: 2.18 K/UL — SIGNIFICANT CHANGE UP (ref 1.8–7.4)
NEUTROPHILS NFR BLD AUTO: 47.8 % — SIGNIFICANT CHANGE UP (ref 43–77)
NRBC # BLD: 0 /100 WBCS — SIGNIFICANT CHANGE UP (ref 0–0)
NT-PROBNP SERPL-SCNC: 133 PG/ML — SIGNIFICANT CHANGE UP (ref 0–300)
PLATELET # BLD AUTO: 224 K/UL — SIGNIFICANT CHANGE UP (ref 150–400)
POTASSIUM SERPL-MCNC: 4.1 MMOL/L — SIGNIFICANT CHANGE UP (ref 3.5–5.3)
POTASSIUM SERPL-SCNC: 4.1 MMOL/L — SIGNIFICANT CHANGE UP (ref 3.5–5.3)
PROT SERPL-MCNC: 6.9 G/DL — SIGNIFICANT CHANGE UP (ref 6–8.3)
RBC # BLD: 5.32 M/UL — SIGNIFICANT CHANGE UP (ref 4.2–5.8)
RBC # FLD: 12.7 % — SIGNIFICANT CHANGE UP (ref 10.3–14.5)
SODIUM SERPL-SCNC: 142 MMOL/L — SIGNIFICANT CHANGE UP (ref 135–145)
TROPONIN T, HIGH SENSITIVITY RESULT: 10 NG/L — SIGNIFICANT CHANGE UP (ref 0–51)
TROPONIN T, HIGH SENSITIVITY RESULT: 11 NG/L — SIGNIFICANT CHANGE UP (ref 0–51)
WBC # BLD: 4.56 K/UL — SIGNIFICANT CHANGE UP (ref 3.8–10.5)
WBC # FLD AUTO: 4.56 K/UL — SIGNIFICANT CHANGE UP (ref 3.8–10.5)

## 2024-02-10 PROCEDURE — 85025 COMPLETE CBC W/AUTO DIFF WBC: CPT

## 2024-02-10 PROCEDURE — 83605 ASSAY OF LACTIC ACID: CPT

## 2024-02-10 PROCEDURE — 71046 X-RAY EXAM CHEST 2 VIEWS: CPT

## 2024-02-10 PROCEDURE — 36415 COLL VENOUS BLD VENIPUNCTURE: CPT

## 2024-02-10 PROCEDURE — 84132 ASSAY OF SERUM POTASSIUM: CPT

## 2024-02-10 PROCEDURE — 99285 EMERGENCY DEPT VISIT HI MDM: CPT

## 2024-02-10 PROCEDURE — 99236 HOSP IP/OBS SAME DATE HI 85: CPT

## 2024-02-10 PROCEDURE — 99285 EMERGENCY DEPT VISIT HI MDM: CPT | Mod: 25

## 2024-02-10 PROCEDURE — 82330 ASSAY OF CALCIUM: CPT

## 2024-02-10 PROCEDURE — 82947 ASSAY GLUCOSE BLOOD QUANT: CPT

## 2024-02-10 PROCEDURE — 83735 ASSAY OF MAGNESIUM: CPT

## 2024-02-10 PROCEDURE — 82435 ASSAY OF BLOOD CHLORIDE: CPT

## 2024-02-10 PROCEDURE — 82803 BLOOD GASES ANY COMBINATION: CPT

## 2024-02-10 PROCEDURE — 80053 COMPREHEN METABOLIC PANEL: CPT

## 2024-02-10 PROCEDURE — 84484 ASSAY OF TROPONIN QUANT: CPT

## 2024-02-10 PROCEDURE — 84295 ASSAY OF SERUM SODIUM: CPT

## 2024-02-10 PROCEDURE — 83880 ASSAY OF NATRIURETIC PEPTIDE: CPT

## 2024-02-10 PROCEDURE — 71046 X-RAY EXAM CHEST 2 VIEWS: CPT | Mod: 26

## 2024-02-10 PROCEDURE — 85018 HEMOGLOBIN: CPT

## 2024-02-10 PROCEDURE — 85014 HEMATOCRIT: CPT

## 2024-02-10 RX ORDER — ATORVASTATIN CALCIUM 80 MG/1
10 TABLET, FILM COATED ORAL AT BEDTIME
Refills: 0 | Status: DISCONTINUED | OUTPATIENT
Start: 2024-02-10 | End: 2024-02-10

## 2024-02-10 RX ORDER — AMLODIPINE BESYLATE 2.5 MG/1
1 TABLET ORAL
Refills: 0 | DISCHARGE

## 2024-02-10 RX ORDER — FLUTICASONE PROPIONATE 50 MCG
1 SPRAY, SUSPENSION NASAL
Refills: 0 | DISCHARGE

## 2024-02-10 RX ORDER — ACETAMINOPHEN 500 MG
650 TABLET ORAL EVERY 6 HOURS
Refills: 0 | Status: DISCONTINUED | OUTPATIENT
Start: 2024-02-10 | End: 2024-02-10

## 2024-02-10 RX ORDER — METOPROLOL TARTRATE 50 MG
25 TABLET ORAL DAILY
Refills: 0 | Status: DISCONTINUED | OUTPATIENT
Start: 2024-02-10 | End: 2024-02-10

## 2024-02-10 RX ORDER — FLUTICASONE PROPIONATE 50 MCG
1 SPRAY, SUSPENSION NASAL
Refills: 0 | Status: DISCONTINUED | OUTPATIENT
Start: 2024-02-10 | End: 2024-02-10

## 2024-02-10 RX ORDER — ASPIRIN/CALCIUM CARB/MAGNESIUM 324 MG
81 TABLET ORAL DAILY
Refills: 0 | Status: DISCONTINUED | OUTPATIENT
Start: 2024-02-10 | End: 2024-02-10

## 2024-02-10 RX ORDER — ALFUZOSIN HYDROCHLORIDE 10 MG/1
1 TABLET, EXTENDED RELEASE ORAL
Refills: 0 | DISCHARGE

## 2024-02-10 RX ORDER — AZELASTINE 137 UG/1
1 SPRAY, METERED NASAL
Refills: 0 | DISCHARGE

## 2024-02-10 RX ORDER — AMLODIPINE BESYLATE 2.5 MG/1
5 TABLET ORAL DAILY
Refills: 0 | Status: DISCONTINUED | OUTPATIENT
Start: 2024-02-10 | End: 2024-02-10

## 2024-02-10 NOTE — ED PROVIDER NOTE - PROGRESS NOTE DETAILS
Geetha, PGY3: Spoke with Dr. Aguayo who would like to have the patient admitted to the patient for unstable angina. Will do labs and cxr. EKG found with T wave inversions on leads V4 to V6 new from prior EKG.

## 2024-02-10 NOTE — DISCHARGE NOTE PROVIDER - NSDCFUSCHEDAPPT_GEN_ALL_CORE_FT
Roman RamirezFormer[facility] Physician [facility]  CARDIOLOGY [address]  Scheduled Appointment: 04/23/2024

## 2024-02-10 NOTE — H&P ADULT - NSHPREVIEWOFSYSTEMS_GEN_ALL_CORE
CONSTITUTIONAL: No fever, weight loss, or fatigue  EYES: No eye pain, visual disturbances, or discharge  ENMT:  No difficulty hearing, tinnitus, vertigo; No sinus or throat pain  NECK: No pain or stiffness  RESPIRATORY: No cough, wheezing, chills or hemoptysis; No shortness of breath  CARDIOVASCULAR: +anterior chest pain radiating rightward laterally (resolved); No palpitations, dizziness, or leg swelling  GASTROINTESTINAL: No abdominal or epigastric pain. No nausea, vomiting, or hematemesis; No diarrhea or constipation. No melena or hematochezia.  GENITOURINARY: No dysuria, frequency, hematuria, or incontinence  NEUROLOGICAL: No headaches, memory loss, loss of strength, numbness, or tremors  SKIN: No itching, burning, rashes, or lesions   LYMPH NODES: No enlarged glands  ENDOCRINE: No heat or cold intolerance; No hair loss  MUSCULOSKELETAL: No joint pain or swelling; No muscle, back, or extremity pain  PSYCHIATRIC: No depression, anxiety, mood swings, or difficulty sleeping  HEME/LYMPH: No easy bruising, or bleeding gums

## 2024-02-10 NOTE — H&P ADULT - ASSESSMENT
63 yo male with PMH of CAD s/p AUDREY x 3 in 2018, HTN, HLD, and BPH who presented to the ED with chest pain x 1 day admitted for cardiac work-up. However, patient's symptoms resolved and unwilling to stay if tests not performed same day, requesting to be discharged with outpatient f/u with his Cardiologist Dr. Roman Ramirez.

## 2024-02-10 NOTE — DISCHARGE NOTE NURSING/CASE MANAGEMENT/SOCIAL WORK - NSDCPEFALRISK_GEN_ALL_CORE
For information on Fall & Injury Prevention, visit: [email or web address] OR  [email or web address] OR  https://www.cdc.gov/steadi/patient.html

## 2024-02-10 NOTE — ED ADULT NURSE NOTE - OBJECTIVE STATEMENT
64M aaox4 ambulatory came as walk in from home with c/o chest discomfort described as tightness started yesterday radiating to his back waxing and waning, with h/o CAD (coronary artery disease) HTN (hypertension) Hyperlipidemia, unspecified hyperlipidemia type Stented coronary artery x3, on aspirin and Plavix. Patient denies any SOB, diaphoresis, nausea, vomiting, or abd pain, denies any chills or fever.  Rt wrist 18g IV access inserted, labs sent pending results.

## 2024-02-10 NOTE — H&P ADULT - PROBLEM SELECTOR PLAN 1
episode of substernal chest pain radiating rightward lasting several hours now resolved  - currently chest pain free  - trops neg x 2  - EKG NSR with TWI in inferolateral leads  - Cardiology consulted, recommending TTE and exercise stress  - however, patient unwilling to stay if tests not performed same day  - d/w Card fellow Dr. Handley - given chest pain free at this time, okay from cards standpoint for discharge and outpatient f/u with his outpatient Cardiologist Dr. Ramirez to have testing performed outpatient

## 2024-02-10 NOTE — DISCHARGE NOTE PROVIDER - NPI NUMBER (FOR SYSADMIN USE ONLY) :
Recommend:  1.  MRI left ankle: to schedule [phone]. We will call you with the results  2.  No weight bearing in tall boot  3. Vitamin D level- can be done at any [place] lab  4. We will call or send message with CT results    It is recommended you schedule a follow-up appointment with Dr. Jim in 3 weeks.     During the hours of 8:00 am to 5:00 pm Monday through Friday, please contact us at one of the following offices:  [facility] [phone], or [place] office at [phone].    If it is urgent that you speak with someone outside of these hours, our [facility] Center will be able to assist you at [phone].    For more information on foot and ankle conditions and treatments, please visit the American [employer] Foot and Ankle Society's patient education website (Foot Care MD) at:  [email or web address]    Thank you for choosing [facility] as your Orthopedic provider!      
[[phone]]

## 2024-02-10 NOTE — H&P ADULT - NSHPADDITIONALINFOADULT_GEN_ALL_CORE
.  Alysha Gallo MD  Division of Hospital Medicine  [facility]   Available on Microsoft Teams - messages preferred prior to calls.    Clear from Cards standpoint for f/u with outpatient Cardiologist Dr. Ramirez for further testing as outpatient.  Cards fellow to inform Dr. Ramirez, but I will also send him e-mail today as well for follow-up.  Discharge planning time spent: 40 minutes.    Plan discussed with patient, Cardiology Fellow Dr. Handley, and medicine NP Arlet.

## 2024-02-10 NOTE — DISCHARGE NOTE PROVIDER - NSDCCPCAREPLAN_GEN_ALL_CORE_FT
PRINCIPAL DISCHARGE DIAGNOSIS  Diagnosis: Unstable angina  Assessment and Plan of Treatment: You presented to the ED  with complaints of right sided chest pain which have since resolved. Please continue to take all your current medications as prescribed. Please follow-up with Dr. Ramirez within 1-2 days of discharge.

## 2024-02-10 NOTE — ED PROVIDER NOTE - ATTENDING CONTRIBUTION TO CARE
Emergency Medicine Attending MD Chapman:  patient seen and evaluated with the resident.  I was present for key portions of the History & Physical, and I agree with the Impression & Plan.    Patient is a 64-year-old male, complaining of less than 24 hours of right-sided chest pain that radiates into the shoulder.  Quality is pressure-like, similar to when he had his 3 coronary stents placed in 2018, by Dr. Ramirez.    No associated shortness of breath, peripheral edema, nausea, vomiting, fever, chills.    Patient took his 81 mg aspirin and 75 mg Plavix this morning.    VS: wnl  Gen: Well appearing adult male in NAD  Head: NC/AT  Neck: trachea midline  Resp:  No distress  CV: RRR, no RMG  Abd: nondistended  Ext: no deformities  Neuro:  A&Ox4 appears non focal  Skin:  Warm and dry as visualized  Psych:  Normal affect and mood    Medical decision making:  HPI concerning for unstable angina.  ECG is not consistent with STEMI at this time.  Suspect the patient will need to be admitted for repeat cardiac angiogram.    Full set of labs, troponin pending.  Chest x-ray.    ECG directly visualized by me and shows normal sinus rhythm, rate 85, nonspecific T wave inversions throughout.  No ST elevations or depressions.

## 2024-02-10 NOTE — ED PROVIDER NOTE - OBJECTIVE STATEMENT
see mdm 64-year-old male patient past medical history of CAD with stents, hypertension, hyperlipidemia who presents to the emergency department for right-sided chest pain that began 1 day ago.  Patient endorses pain was initially intermittent but has since been persistent and nonexertional.  Not associated with shortness of breath, palpitations, leg edema, nausea, vomiting, diaphoresis or any other concerns.  Patient follows up with Dr. Aguayo yearly for his cardiac workup.  Last stent was in 2018.

## 2024-02-10 NOTE — ED ADULT NURSE NOTE - DRUG PRE-SCREENING (DAST -1)
Quality 137: Melanoma: Continuity Of Care - Recall System: Patient information entered into a recall system that includes: target date for the next exam specified AND a process to follow up with patients regarding missed or unscheduled appointments Quality 474: Zoster Vaccination Status: Shingrix Vaccination not Administered or Previously Received, Reason not Otherwise Specified Quality 111:Pneumonia Vaccination Status For Older Adults: Pneumococcal Vaccination Previously Received Quality 402: Tobacco Use And Help With Quitting Among Adolescents: Patient screened for tobacco and never smoked Detail Level: Detailed Quality 131: Pain Assessment And Follow-Up: Pain assessment NOT documented as being performed, documentation the patient is not eligible for a pain assessment using a standardized tool Quality 110: Preventive Care And Screening: Influenza Immunization: Influenza immunization was not ordered or administered, reason not given Statement Selected

## 2024-02-10 NOTE — DISCHARGE NOTE NURSING/CASE MANAGEMENT/SOCIAL WORK - NSDCFUADDAPPT_GEN_ALL_CORE_FT
APPTS ARE READY TO BE MADE: [x] YES    Best Family or Patient Contact (if needed):    Additional Information about above appointments (if needed):    1: outpatient f/u with Dr. Ramirez for Echo and Stress  2:   3:     Other comments or requests:

## 2024-02-10 NOTE — H&P ADULT - NSHPPHYSICALEXAM_GEN_ALL_CORE
CONSTITUTIONAL: NAD, well-developed, well-groomed  EYES: PERRLA; conjunctiva and sclera clear  ENMT: Moist oral mucosa, no pharyngeal injection or exudates; normal dentition  NECK: Supple, no palpable masses; no thyromegaly  RESPIRATORY: Normal respiratory effort; lungs are clear to auscultation bilaterally, no wheeze nor crackles  CARDIOVASCULAR: Regular rate and rhythm, normal S1 and S2, no murmur/rub/gallop; No lower extremity edema  ABDOMEN: Soft, Non-distended, Nontender to palpation, normoactive bowel sounds  MUSCULOSKELETAL:  No clubbing or cyanosis of digits; no joint swelling or tenderness to palpation  PSYCH: A+O to person, place, and time; affect appropriate  NEUROLOGY: no focal deficits, moving all extremities strong  SKIN: No rashes; no palpable lesions

## 2024-02-10 NOTE — DISCHARGE NOTE PROVIDER - HOSPITAL COURSE
HPI:  65 yo male with PMH of CAD s/p AUDREY x 3 in 2018, HTN, HLD, and BPH who presented to the ED with chest pain x 1 day. Patient states he was driving when he experience sharp substernal chest pain that radiated to the right laterally. Self resolved after few hours. Denies any associated dizziness, diaphoresis, shortness of breath, nor nausea vomiting. He states pain felt similar to episode he had in 2018 when he required stent placement which prompted him to present to the ED.     In the ED, vital stable. Labs unremarkable. Trops neg x 2 (10-->11). EKG NSR with TWI in inferolateral leads. Cardiology consulted in the ED, recommending TTE and exercise stress. Admitted to medicine for further work-up but during my initial encounter, patient voiced unwilling to stay if tests not performed today and would prefer to follow-up with his Cardiologist Dr. Ramirez as outpatient. (10 Feb 2024 14:43)    Hospital Course:  Chest pain resolved. Trops negative x2, EKG as above. CXR with clear lungs. Patient was evaluated by Cardiology recommending TTE and exercise stress test. However, patient unwilling to stay if TTE and stress not performed same day. Discussed with Cardiology fellow, given patient is chest pain free at this time, would be clear from Cardiology standpoint for discharge and pursue TTE and stress test as outpatient with his Cardiologist Dr. Ramirez this week.    Important Medication Changes and Reason:  none    Active or Pending Issues Requiring Follow-up:  Follow-up with Cardiologist Dr. Roman Ramirez in 1-3 days for outpatient TTE and Stress test    Advanced Directives:   [x] Full code  [ ] DNR  [ ] Hospice    Discharge Diagnoses:  Chest Pain  Unstable Angina HPI:  63 yo male with PMH of CAD s/p AUDREY x 3 in 2018, HTN, HLD, and BPH who presented to the ED with chest pain x 1 day. Patient states he was driving when he experience sharp substernal chest pain that radiated to the right laterally. Self resolved after few hours. Denies any associated dizziness, diaphoresis, shortness of breath, nor nausea vomiting. He states pain felt similar to episode he had in 2018 when he required stent placement which prompted him to present to the ED.     In the ED, vital stable. Labs unremarkable. Trops neg x 2 (10-->11). EKG NSR with TWI in inferolateral leads. Cardiology consulted in the ED, recommending TTE and exercise stress. Admitted to medicine for further work-up but during my initial encounter, patient voiced unwilling to stay if tests not performed today and would prefer to follow-up with his Cardiologist Dr. Ramirez as outpatient. (10 Feb 2024 14:43)    Hospital Course:  Chest pain resolved. Trops negative x2, EKG as above. CXR with clear lungs. Patient was evaluated by Cardiology recommending TTE and exercise stress test. However, patient unwilling to stay if TTE and stress not performed same day. Discussed with Cardiology fellow, given patient is chest pain free at this time, would be clear from Cardiology standpoint for discharge and pursue TTE and stress test as outpatient with his Cardiologist Dr. Ramirez this week.    Important Medication Changes and Reason:    none    Active or Pending Issues Requiring Follow-up:  Follow-up with Cardiologist Dr. Roman Ramirez in 1-3 days for outpatient TTE and Stress test    Advanced Directives:   [x] Full code  [ ] DNR  [ ] Hospice    Discharge Diagnoses:  Chest Pain  Unstable Angina

## 2024-02-10 NOTE — CONSULT NOTE ADULT - SUBJECTIVE AND OBJECTIVE BOX
Cardiology Consult Note   [Please check [email or web address] password: [email or web address]" for cardiology service schedule and contact information]    History of Present Illness:   63 yo M w/ hx of CAD s/p AUDREY, HTN, HLD who presented to the ED w/ CP for which cardiology is consulted.    Pt reports R sided chest pain that feels similar to the pain he had prior to his prev stent placements. Reports pain is substernal, R sided and intermittent but non-exertional. Denies sob, palpitations, LE swelling, n/v/d/c.     In the ED he was HDS w/ normal labs, neg trop x2.    EKG:  NSR w/ TWI in the inferolateral leads    HOME CARDIAC MEDS:  Metop succ 25  ASA  Atorva 80  Lisinopril 2.5    PMHx: reviewed, see below  SOCIAL HISTORY:  unchanged    MEDICATIONS:    REVIEW OF SYSTEMS:  CV: chest pain (-), palpitation (-), orthopnea (-), PND (-), edema (-)  PULM: SOB (-), cough (-), wheezing (-), hemoptysis (-).   CONST: fever (-), chills (-) or fatigue (-)  GI: abdominal distension (-), abdominal pain (-) , nausea/vomiting (-), hematemesis, (-), melena (-), hematochezia (-)  : dysuria (-), frequency (-), hematuria (-).   NEURO: numbness (-), weakness (-), dizziness (-)  SKIN: itching (-), rash (-)  HEENT:  visual changes (-); vertigo or throat pain (-);  neck stiffness (-)     All other review of systems is negative unless indicated above.   -------------------------------------------------------------------------------------------  VITALS:  T(C): 36.8 (02-10-24 @ 06:39), Max: 36.8 (02-10-24 @ 06:39)  HR: 79 (02-10-24 @ 09:17) (79 - 97)  BP: 110/73 (02-10-24 @ 09:17) (110/73 - 132/80)  RR: 13 (02-10-24 @ 09:17) (13 - 18)  SpO2: 99% (02-10-24 @ 09:17) (97% - 99%)  Wt(kg): --  I&O's Summary      PHYSICAL EXAM:  GEN: NAD, sitting in bed  HEENT: NCAT, EOMI  CV: RRR, Ns1/s2, no m/r/g, JVP not elevated  RESP: CTA B/l, no w/r/r  ABD: soft, nt, nd  EXT: warm, 2+ pulses, no edema  SKIN: no visible lesions, rashes  NEURO: AAOx3, no focal deficits  PSYCH: Calm, cooperative    -------------------------------------------------------------------------------------------  LABS:                          14.7   4.56  )-----------( 224      ( 10 Feb 2024 07:28 )             43.1     02-10    142  |  103  |  12  ----------------------------<  146<H>  4.1   |  27  |  0.82    Ca    9.3      10 Feb 2024 07:28  Mg     2.1     02-10    TPro  6.9  /  Alb  4.3  /  TBili  0.3  /  DBili  x   /  AST  21  /  ALT  45  /  AlkPhos  50  02-10      CARDIAC MARKERS ( 10 Feb 2024 08:56 )  10 ng/L / x     / x     / x     / x     / x      CARDIAC MARKERS ( 10 Feb 2024 07:28 )  11 ng/L / x     / x     / x     / x     / x                -------------------------------------------------------------------------------------------  Meds:    -------------------------------------------------------------------------------------------  Cardiovascular Diagnostic Testing:      -------------------------------------------------------------------------------------------  Assessment and Plan:   63 yo M w/ hx of CAD s/p AUDREY to the pRCA and dRCA/PDA, HTN, HLD who presented to the ED w/ CP for which cardiology is consulted.    #CP  Pt w/ cp, neg ekg and neg trop. Possible unstable angina given hx; although sx of chest pain are less typical. Pt is high risk, but reassuringly did not make any trop. Would favor stress testing and structural eval prior to [facility] at this time.    Recommendations:  - Please order a TTE  - please order a exercise nuclear test  - continue ASA, statin  - continue BB  - continue ACEi/ARB    *** Recommendations are preliminary until cosigned by the attending.    Ramy Handley MD  Cardiology Fellow    For all New Consults and Questions:  [email or web address]   Login: [employer]   Cardiology Consult Note   [Please check [email or web address] password: [email or web address]" for cardiology service schedule and contact information]    History of Present Illness:   65 yo M w/ hx of CAD s/p AUDREY, HTN, HLD who presented to the ED w/ CP for which cardiology is consulted.    Pt reports R sided chest pain that feels similar to the pain he had prior to his prev stent placements. Reports pain is substernal, R sided and intermittent but non-exertional. Denies sob, palpitations, LE swelling, n/v/d/c.     In the ED he was HDS w/ normal labs, neg trop x2.    EKG:  NSR w/ TWI in the inferolateral leads    HOME CARDIAC MEDS:  Metop succ 25  ASA  Atorva 80  Lisinopril 2.5    PMHx: reviewed, see below  SOCIAL HISTORY:  unchanged    MEDICATIONS:    REVIEW OF SYSTEMS:  CV: chest pain (-), palpitation (-), orthopnea (-), PND (-), edema (-)  PULM: SOB (-), cough (-), wheezing (-), hemoptysis (-).   CONST: fever (-), chills (-) or fatigue (-)  GI: abdominal distension (-), abdominal pain (-) , nausea/vomiting (-), hematemesis, (-), melena (-), hematochezia (-)  : dysuria (-), frequency (-), hematuria (-).   NEURO: numbness (-), weakness (-), dizziness (-)  SKIN: itching (-), rash (-)  HEENT:  visual changes (-); vertigo or throat pain (-);  neck stiffness (-)     All other review of systems is negative unless indicated above.   -------------------------------------------------------------------------------------------  VITALS:  T(C): 36.8 (02-10-24 @ 06:39), Max: 36.8 (02-10-24 @ 06:39)  HR: 79 (02-10-24 @ 09:17) (79 - 97)  BP: 110/73 (02-10-24 @ 09:17) (110/73 - 132/80)  RR: 13 (02-10-24 @ 09:17) (13 - 18)  SpO2: 99% (02-10-24 @ 09:17) (97% - 99%)  Wt(kg): --  I&O's Summary      PHYSICAL EXAM:  GEN: NAD, sitting in bed  HEENT: NCAT, EOMI  CV: RRR, Ns1/s2, no m/r/g, JVP not elevated  RESP: CTA B/l, no w/r/r  ABD: soft, nt, nd  EXT: warm, 2+ pulses, no edema  SKIN: no visible lesions, rashes  NEURO: AAOx3, no focal deficits  PSYCH: Calm, cooperative    -------------------------------------------------------------------------------------------  LABS:                          14.7   4.56  )-----------( 224      ( 10 Feb 2024 07:28 )             43.1     02-10    142  |  103  |  12  ----------------------------<  146<H>  4.1   |  27  |  0.82    Ca    9.3      10 Feb 2024 07:28  Mg     2.1     02-10    TPro  6.9  /  Alb  4.3  /  TBili  0.3  /  DBili  x   /  AST  21  /  ALT  45  /  AlkPhos  50  02-10      CARDIAC MARKERS ( 10 Feb 2024 08:56 )  10 ng/L / x     / x     / x     / x     / x      CARDIAC MARKERS ( 10 Feb 2024 07:28 )  11 ng/L / x     / x     / x     / x     / x                -------------------------------------------------------------------------------------------  Meds:    -------------------------------------------------------------------------------------------  Cardiovascular Diagnostic Testing:      -------------------------------------------------------------------------------------------  Assessment and Plan:   65 yo M w/ hx of CAD s/p AUDREY to the pRCA and dRCA/PDA, HTN, HLD who presented to the ED w/ CP for which cardiology is consulted.    #CP  Pt w/ cp, neg ekg and neg trop. Possible unstable angina given hx; although sx of chest pain are less typical. Pt is high risk, but reassuringly did not make any trop. Would favor stress testing and structural eval prior to [facility] at this time.    Recommendations:  - Please order a TTE  - please order a exercise nuclear test  - continue ASA, statin  - continue BB  - continue ACEi/ARB    Update: 2:40pm  Pt requesting to leave w/ outpt follow up. Pt was not staffed by attending. Will plan for outpt f/up.    Ramy Handley MD  Cardiology Fellow    For all New Consults and Questions:  [email or web address]   Login: [employer]

## 2024-02-10 NOTE — H&P ADULT - NSHPSOCIALHISTORY_GEN_ALL_CORE
former smoker, smoked 1PPD x 10-15 years. quit 2018 when stents placed  denies EtOH use  works as [employer]

## 2024-02-10 NOTE — ED ADULT NURSE REASSESSMENT NOTE - NS ED NURSE REASSESS COMMENT FT1
Patient wants to sign out AMA and will just follow up with his cardiologist and do the Echo and Stress test about his heart issues. SHREYA Nice made aware.

## 2024-02-10 NOTE — DISCHARGE NOTE PROVIDER - NSDCFUADDAPPT_GEN_ALL_CORE_FT
[___] : [unfilled] APPTS ARE READY TO BE MADE: [x ] YES    Best Family or Patient Contact (if needed):    Additional Information about above appointments (if needed):    1: Echo  2: Stress  3:     Other comments or requests:    APPTS ARE READY TO BE MADE: [x] YES    Best Family or Patient Contact (if needed):    Additional Information about above appointments (if needed):    1: outpatient f/u with Dr. Ramirez for Echo and Stress  2:   3:     Other comments or requests:    APPTS ARE READY TO BE MADE: [x] YES    Best Family or Patient Contact (if needed):    Additional Information about above appointments (if needed):    1: outpatient f/u with Dr. Ramirez for Echo and Stress  2:   3:     Other comments or requests:     Providers office was contacted to secure an appointment, however the office will follow up with the patient/caregiver directly.

## 2024-02-10 NOTE — DISCHARGE NOTE PROVIDER - CARE PROVIDER_API CALL
Roman Ramirez  Interventional Cardiology  [address] [place] [id number]  Phone: [phone]  Fax: [phone]  Follow Up Time: 1-3 days

## 2024-02-10 NOTE — ED PROVIDER NOTE - CLINICAL SUMMARY MEDICAL DECISION MAKING FREE TEXT BOX
Medical decision making:  HPI concerning for unstable angina.  ECG is not consistent with STEMI at this time.  Suspect the patient will need to be admitted for repeat cardiac angiogram.    Full set of labs, troponin pending.  Chest x-ray.    ECG directly visualized by me and shows normal sinus rhythm, rate 85, nonspecific T wave inversions throughout.  No ST elevations or depressions.

## 2024-02-10 NOTE — DISCHARGE NOTE NURSING/CASE MANAGEMENT/SOCIAL WORK - PATIENT PORTAL LINK FT
You can access the FollowMyHealth Patient Portal offered by [facility] by registering at the following website: [email or web address]. By joining [employer]’s FollowMyHealth portal, you will also be able to view your health information using other applications (apps) compatible with our system.

## 2024-02-10 NOTE — H&P ADULT - NSHPLABSRESULTS_GEN_ALL_CORE
Labs reviewed:                         14.7   4.56  )-----------( 224      ( 10 Feb 2024 07:28 )             43.1     02-10    142  |  103  |  12  ----------------------------<  146<H>  4.1   |  27  |  0.82    Ca    9.3      10 Feb 2024 07:28  Mg     2.1     02-10    TPro  6.9  /  Alb  4.3  /  TBili  0.3  /  DBili  x   /  AST  21  /  ALT  45  /  AlkPhos  50  02-10          Urinalysis Basic - ( 10 Feb 2024 07:28 )    Color: x / Appearance: x / SG: x / pH: x  Gluc: 146 mg/dL / Ketone: x  / Bili: x / Urobili: x   Blood: x / Protein: x / Nitrite: x   Leuk Esterase: x / RBC: x / WBC x   Sq Epi: x / Non Sq Epi: x / Bacteria: x      Imaging personally reviewed:   CXR with clear lungs    ECG reviewed and interpreted: NSR with TWI in inferolateral leads

## 2024-02-10 NOTE — DISCHARGE NOTE PROVIDER - NSDCMRMEDTOKEN_GEN_ALL_CORE_FT
alfuzosin 10 mg oral tablet, extended release: 1 tab(s) orally once a day  aspirin 81 mg oral delayed release tablet: 1 tab(s) orally once a day  azelastine 137 mcg/inh (0.1%) nasal spray: 1 spray(s) intranasally 2 times a day  Flonase 50 mcg/inh nasal spray: 1 spray(s) in each nostril 2 times a day  metoprolol succinate 25 mg oral tablet, extended release: 1 tab(s) orally once a day  Norvasc 5 mg oral tablet: 1 tab(s) orally once a day  pravastatin 40 mg oral tablet: 1 tab(s) orally once a day

## 2024-02-10 NOTE — H&P ADULT - HISTORY OF PRESENT ILLNESS
65 yo male with PMH of CAD s/p AUDREY x 3 in 2018, HTN, HLD, and BPH who presented to the ED with chest pain x 1 day. Patient states he was driving when he experience sharp substernal chest pain that radiated to the right laterally. Self resolved after few hours. Denies any associated dizziness, diaphoresis, shortness of breath, nor nausea vomiting. He states pain felt similar to episode he had in 2018 when he required stent placement which prompted him to present to the ED.     In the ED, vital stable. Labs unremarkable. Trops neg x 2 (10-->11). EKG NSR with TWI in inferolateral leads. Cardiology consulted in the ED, recommending TTE and exercise stress. Admitted to medicine for further work-up but during my initial encounter, patient voiced unwilling to stay if tests not performed today and would prefer to follow-up with his Cardiologist Dr. Ramirez as outpatient.

## 2024-02-13 ENCOUNTER — NON-APPOINTMENT (OUTPATIENT)
Age: 64
End: 2024-02-13

## 2024-02-23 ENCOUNTER — NON-APPOINTMENT (OUTPATIENT)
Age: 64
End: 2024-02-23

## 2024-02-23 ENCOUNTER — APPOINTMENT (OUTPATIENT)
Dept: CARDIOLOGY | Facility: CLINIC | Age: 64
End: 2024-02-23
Payer: MEDICAID

## 2024-02-23 VITALS
DIASTOLIC BLOOD PRESSURE: 80 MMHG | OXYGEN SATURATION: 97 % | SYSTOLIC BLOOD PRESSURE: 120 MMHG | HEIGHT: 66 IN | HEART RATE: 74 BPM

## 2024-02-23 DIAGNOSIS — I25.10 ATHEROSCLEROTIC HEART DISEASE OF NATIVE CORONARY ARTERY W/OUT ANGINA PECTORIS: ICD-10-CM

## 2024-02-23 PROCEDURE — 93000 ELECTROCARDIOGRAM COMPLETE: CPT

## 2024-02-23 PROCEDURE — 99214 OFFICE O/P EST MOD 30 MIN: CPT | Mod: 25

## 2024-02-23 NOTE — DISCUSSION/SUMMARY
[FreeTextEntry1] : 1.  cad-  s/p stent to the RCA.  Given his chest pain, will get a nuclear stress test.  2.  hyperlipidemia -  will start pravachol. [EKG obtained to assist in diagnosis and management of assessed problem(s)] : EKG obtained to assist in diagnosis and management of assessed problem(s)

## 2024-02-23 NOTE — HISTORY OF PRESENT ILLNESS
[FreeTextEntry1] : He presented March 22, 2018 with a STEMI after shoveling snow.  He is s/p stent to the RCA.  He had stress tests prior to the procedure and he states that they were fine.  He was recently in the ED with chest pain and was discharged home because he didnt want to stay.  He feels well now.  The pain was in the chest to the back.  No chest pain.  Has not been exercising.  Has been trying to follow a diet.  No DARSHAN< PND or orthopnea.   Did not have issues with covid.

## 2024-02-23 NOTE — PHYSICAL EXAM
[Normal Appearance] : normal appearance [Well Groomed] : well groomed [General Appearance - Well Developed] : well developed [General Appearance - Well Nourished] : well nourished [No Deformities] : no deformities [General Appearance - In No Acute Distress] : no acute distress [Eyelids - No Xanthelasma] : the eyelids demonstrated no xanthelasmas [Normal Oral Mucosa] : normal oral mucosa [Normal Conjunctiva] : the conjunctiva exhibited no abnormalities [No Oral Cyanosis] : no oral cyanosis [No Oral Pallor] : no oral pallor [Normal Jugular Venous A Waves Present] : normal jugular venous A waves present [No Jugular Venous Ramsey A Waves] : no jugular venous ramsye A waves [Normal Jugular Venous V Waves Present] : normal jugular venous V waves present [Auscultation Breath Sounds / Voice Sounds] : lungs were clear to auscultation bilaterally [Respiration, Rhythm And Depth] : normal respiratory rhythm and effort [Exaggerated Use Of Accessory Muscles For Inspiration] : no accessory muscle use [Heart Sounds] : normal S1 and S2 [Heart Rate And Rhythm] : heart rate and rhythm were normal [Murmurs] : no murmurs present [Abdomen Tenderness] : non-tender [Abdomen Soft] : soft [Abnormal Walk] : normal gait [Abdomen Mass (___ Cm)] : no abdominal mass palpated [Gait - Sufficient For Exercise Testing] : the gait was sufficient for exercise testing [Nail Clubbing] : no clubbing of the fingernails [Cyanosis, Localized] : no localized cyanosis [Petechial Hemorrhages (___cm)] : no petechial hemorrhages [] : no ischemic changes

## 2024-04-08 ENCOUNTER — APPOINTMENT (OUTPATIENT)
Dept: CV DIAGNOSTICS | Facility: HOSPITAL | Age: 64
End: 2024-04-08

## 2024-04-23 ENCOUNTER — APPOINTMENT (OUTPATIENT)
Dept: CARDIOLOGY | Facility: CLINIC | Age: 64
End: 2024-04-23

## 2024-04-26 NOTE — ED ADULT NURSE REASSESSMENT NOTE - NS ED NURSE REASSESS COMMENT FT1
Patient remains stable while in the ED, repeat troponin sent pending results, CXR done also pending results. Patient pending admission as per MD note. VS WDL, denies any pain. WNL EF, trace MR

## 2024-05-10 ENCOUNTER — OUTPATIENT (OUTPATIENT)
Dept: OUTPATIENT SERVICES | Facility: HOSPITAL | Age: 64
LOS: 1 days | End: 2024-05-10
Payer: MEDICAID

## 2024-05-10 ENCOUNTER — RESULT REVIEW (OUTPATIENT)
Age: 64
End: 2024-05-10

## 2024-05-10 ENCOUNTER — APPOINTMENT (OUTPATIENT)
Dept: CV DIAGNOSTICS | Facility: HOSPITAL | Age: 64
End: 2024-05-10

## 2024-05-10 ENCOUNTER — TRANSCRIPTION ENCOUNTER (OUTPATIENT)
Age: 64
End: 2024-05-10

## 2024-05-10 ENCOUNTER — NON-APPOINTMENT (OUTPATIENT)
Age: 64
End: 2024-05-10

## 2024-05-10 DIAGNOSIS — I25.10 ATHEROSCLEROTIC HEART DISEASE OF NATIVE CORONARY ARTERY WITHOUT ANGINA PECTORIS: ICD-10-CM

## 2024-05-10 DIAGNOSIS — Z90.49 ACQUIRED ABSENCE OF OTHER SPECIFIED PARTS OF DIGESTIVE TRACT: Chronic | ICD-10-CM

## 2024-05-10 PROCEDURE — 93017 CV STRESS TEST TRACING ONLY: CPT

## 2024-05-10 PROCEDURE — 78452 HT MUSCLE IMAGE SPECT MULT: CPT | Mod: MC

## 2024-05-10 PROCEDURE — 78452 HT MUSCLE IMAGE SPECT MULT: CPT | Mod: 26,MC

## 2024-05-10 PROCEDURE — A9500: CPT

## 2024-05-10 PROCEDURE — 93016 CV STRESS TEST SUPVJ ONLY: CPT | Mod: MC

## 2024-05-10 PROCEDURE — 93018 CV STRESS TEST I&R ONLY: CPT | Mod: MC

## 2024-05-20 ENCOUNTER — RX RENEWAL (OUTPATIENT)
Age: 64
End: 2024-05-20

## 2024-05-23 RX ORDER — PRAVASTATIN SODIUM 40 MG/1
40 TABLET ORAL DAILY
Qty: 90 | Refills: 3 | Status: ACTIVE | COMMUNITY
Start: 2023-03-07 | End: 1900-01-01

## 2024-08-08 NOTE — ED ADULT NURSE NOTE - NS ED NURSE LEVEL OF CONSCIOUSNESS MENTAL STATUS
Problem: Adult Inpatient Plan of Care  Goal: Plan of Care Review  Outcome: Progressing   VN note:   Patient's chart, labs and vital signs reviewed, will be available to intervene if needed.      Awake

## 2024-08-26 ENCOUNTER — NON-APPOINTMENT (OUTPATIENT)
Age: 64
End: 2024-08-26

## 2024-08-27 ENCOUNTER — APPOINTMENT (OUTPATIENT)
Dept: CARDIOLOGY | Facility: CLINIC | Age: 64
End: 2024-08-27
Payer: MEDICAID

## 2024-08-27 ENCOUNTER — NON-APPOINTMENT (OUTPATIENT)
Age: 64
End: 2024-08-27

## 2024-08-27 VITALS
OXYGEN SATURATION: 97 % | HEIGHT: 66 IN | WEIGHT: 180 LBS | HEART RATE: 69 BPM | SYSTOLIC BLOOD PRESSURE: 130 MMHG | BODY MASS INDEX: 28.93 KG/M2 | DIASTOLIC BLOOD PRESSURE: 76 MMHG

## 2024-08-27 DIAGNOSIS — I25.10 ATHEROSCLEROTIC HEART DISEASE OF NATIVE CORONARY ARTERY W/OUT ANGINA PECTORIS: ICD-10-CM

## 2024-08-27 PROCEDURE — 99214 OFFICE O/P EST MOD 30 MIN: CPT | Mod: 25

## 2024-08-27 PROCEDURE — 93000 ELECTROCARDIOGRAM COMPLETE: CPT

## 2024-08-27 PROCEDURE — G2211 COMPLEX E/M VISIT ADD ON: CPT | Mod: NC

## 2024-08-27 NOTE — DISCUSSION/SUMMARY
[FreeTextEntry1] : 1.  cad-  s/p stent to the RCA. Fixed defect on stress test.  2.  hyperlipidemia -  will start pravachol.  3.  calcification in coronary arteries -  negative stress test.  had stents in past.   [EKG obtained to assist in diagnosis and management of assessed problem(s)] : EKG obtained to assist in diagnosis and management of assessed problem(s)

## 2024-08-27 NOTE — HISTORY OF PRESENT ILLNESS
[FreeTextEntry1] : He presented March 22, 2018 with a STEMI after shoveling snow.  He is s/p stent to the RCA.  He had stress tests prior to the procedure and he states that they were fine.  He was recently in the ED with chest pain and was discharged home because he didnt want to stay.  He feels well now.  The pain was in the chest to the back.  No chest pain.  Has not been exercising.  Has been trying to follow a diet.  No DARSHAN< PND or orthopnea.   Did not have issues with covid.   Had a routine ct lung which showed calcifications in the coronaries whcih we already knew about.  He had a stress test in may which showed only fixed defects.

## 2024-09-19 ENCOUNTER — APPOINTMENT (OUTPATIENT)
Dept: CARDIOLOGY | Facility: CLINIC | Age: 64
End: 2024-09-19

## 2025-03-04 ENCOUNTER — NON-APPOINTMENT (OUTPATIENT)
Age: 65
End: 2025-03-04

## 2025-03-04 ENCOUNTER — APPOINTMENT (OUTPATIENT)
Dept: CARDIOLOGY | Facility: CLINIC | Age: 65
End: 2025-03-04
Payer: MEDICAID

## 2025-03-04 VITALS
OXYGEN SATURATION: 97 % | HEIGHT: 66 IN | BODY MASS INDEX: 28.93 KG/M2 | SYSTOLIC BLOOD PRESSURE: 122 MMHG | HEART RATE: 72 BPM | WEIGHT: 180 LBS | DIASTOLIC BLOOD PRESSURE: 77 MMHG

## 2025-03-04 DIAGNOSIS — I25.10 ATHEROSCLEROTIC HEART DISEASE OF NATIVE CORONARY ARTERY W/OUT ANGINA PECTORIS: ICD-10-CM

## 2025-03-04 PROCEDURE — 93000 ELECTROCARDIOGRAM COMPLETE: CPT

## 2025-03-04 PROCEDURE — 99214 OFFICE O/P EST MOD 30 MIN: CPT | Mod: 25
